# Patient Record
Sex: FEMALE | Race: WHITE | NOT HISPANIC OR LATINO | Employment: UNEMPLOYED | URBAN - NONMETROPOLITAN AREA
[De-identification: names, ages, dates, MRNs, and addresses within clinical notes are randomized per-mention and may not be internally consistent; named-entity substitution may affect disease eponyms.]

---

## 2018-04-19 ENCOUNTER — CONSULT (OUTPATIENT)
Dept: CARDIOLOGY | Facility: CLINIC | Age: 58
End: 2018-04-19

## 2018-04-19 VITALS
HEART RATE: 66 BPM | WEIGHT: 137 LBS | HEIGHT: 60 IN | BODY MASS INDEX: 26.9 KG/M2 | SYSTOLIC BLOOD PRESSURE: 150 MMHG | DIASTOLIC BLOOD PRESSURE: 100 MMHG

## 2018-04-19 DIAGNOSIS — J44.9 COPD MIXED TYPE (HCC): ICD-10-CM

## 2018-04-19 DIAGNOSIS — R06.02 SHORTNESS OF BREATH: ICD-10-CM

## 2018-04-19 DIAGNOSIS — Z72.0 TOBACCO ABUSE: ICD-10-CM

## 2018-04-19 DIAGNOSIS — R07.89 OTHER CHEST PAIN: ICD-10-CM

## 2018-04-19 DIAGNOSIS — E78.00 HYPERCHOLESTEREMIA: ICD-10-CM

## 2018-04-19 DIAGNOSIS — I10 ESSENTIAL HYPERTENSION: ICD-10-CM

## 2018-04-19 DIAGNOSIS — I73.9 PAD (PERIPHERAL ARTERY DISEASE) (HCC): ICD-10-CM

## 2018-04-19 DIAGNOSIS — I77.9 BILATERAL CAROTID ARTERY DISEASE (HCC): Primary | ICD-10-CM

## 2018-04-19 PROCEDURE — 99406 BEHAV CHNG SMOKING 3-10 MIN: CPT | Performed by: INTERNAL MEDICINE

## 2018-04-19 PROCEDURE — 93000 ELECTROCARDIOGRAM COMPLETE: CPT | Performed by: INTERNAL MEDICINE

## 2018-04-19 PROCEDURE — 99204 OFFICE O/P NEW MOD 45 MIN: CPT | Performed by: INTERNAL MEDICINE

## 2018-04-19 RX ORDER — LISINOPRIL AND HYDROCHLOROTHIAZIDE 12.5; 1 MG/1; MG/1
1 TABLET ORAL DAILY
Qty: 30 TABLET | Refills: 8 | Status: SHIPPED | OUTPATIENT
Start: 2018-04-19 | End: 2018-12-13 | Stop reason: SDUPTHER

## 2018-04-19 RX ORDER — CLOPIDOGREL BISULFATE 75 MG/1
75 TABLET ORAL DAILY
COMMUNITY
End: 2018-07-19 | Stop reason: ALTCHOICE

## 2018-04-19 RX ORDER — LISINOPRIL AND HYDROCHLOROTHIAZIDE 12.5; 1 MG/1; MG/1
1 TABLET ORAL DAILY
COMMUNITY
End: 2018-04-19 | Stop reason: SDUPTHER

## 2018-04-19 RX ORDER — ALBUTEROL SULFATE 90 UG/1
2 AEROSOL, METERED RESPIRATORY (INHALATION) EVERY 4 HOURS PRN
COMMUNITY

## 2018-04-19 RX ORDER — ASPIRIN 81 MG/1
81 TABLET ORAL DAILY
COMMUNITY

## 2018-04-19 RX ORDER — PROPRANOLOL HYDROCHLORIDE 20 MG/1
20 TABLET ORAL 3 TIMES DAILY
COMMUNITY

## 2018-04-19 RX ORDER — NIFEDIPINE 30 MG/1
30 TABLET, EXTENDED RELEASE ORAL DAILY
Qty: 30 TABLET | Refills: 8 | Status: SHIPPED | OUTPATIENT
Start: 2018-04-19 | End: 2018-12-13 | Stop reason: SDUPTHER

## 2018-04-19 NOTE — PROGRESS NOTES
I advised the patient of the risks in continuing to use tobacco, and I provided this patient with smoking cessation educational materials.  I also discussed how to quit smoking and the patient has expressed the willingness to quit.      During this visit, I spent > 3-10 minutes counseling the patient regarding smoking cessation.

## 2018-04-19 NOTE — PROGRESS NOTES
CARDIAC COMPLAINTS  chest pressure/discomfort, claudication, dyspnea and Pre OP Clearance      Subjective   Felicia Contreras is a 57 y.o. female came in today for her initial cardiac evaluation.  She has history of hypertension, COPD and also has history of tobacco abuse.  She has been having problem with vision changes, unsteady gait and slurred speech.  She had abnormal carotid ultrasound.  CT of the carotid showed occluded right ICA and 75% left ICA and also has problem with the vertebral artery.  She was seen by the neurosurgeon and is now referred for cardiac clearance.  She does have dull aching chest pain involving the left side of the chest radiating to the neck and the left arm.  It occurs both during exertion as well as rest.  It is associated with increasing shortness of breath.  She also has few episodes of orthopnea.  She has some episodes of dizziness and lightheadedness which happens when she gets up to fast.  She also has bilateral claudication pain mostly on exertion and occasionally at rest.  Her lab work showed erythrocytosis, normal renal function.  Her cholesterol level is not available.  She is a smoker for the last 40 years.  Apparently she was given Chantix but she is afraid to take it.    Past Surgical History:   Procedure Laterality Date   • OTHER SURGICAL HISTORY  04/12/2018    CTA- 75% (L) ICA. 100% (R) ICA. 100% (R) Verteberal artery.. Small aneurysm of Basilar Artery       Current Outpatient Prescriptions   Medication Sig Dispense Refill   • albuterol (PROVENTIL HFA;VENTOLIN HFA) 108 (90 Base) MCG/ACT inhaler Inhale 2 puffs Every 4 (Four) Hours As Needed for Wheezing.     • aspirin 81 MG EC tablet Take 81 mg by mouth Daily.     • clopidogrel (PLAVIX) 75 MG tablet Take 75 mg by mouth Daily.     • lisinopril-hydrochlorothiazide (PRINZIDE,ZESTORETIC) 10-12.5 MG per tablet Take 1 tablet by mouth Daily. 30 tablet 8   • propranolol (INDERAL) 20 MG tablet Take 20 mg by mouth 3 (Three) Times a  Day.     • tiotropium bromide monohydrate (SPIRIVA RESPIMAT) 2.5 MCG/ACT aerosol solution inhaler Inhale 2 puffs Daily.     • nicotine (NICOTROL) 10 MG inhaler Inhale 1 puff As Needed for Smoking Cessation. 30 inhaler 8   • NIFEdipine XL (PROCARDIA XL) 30 MG 24 hr tablet Take 1 tablet by mouth Daily. 30 tablet 8     No current facility-administered medications for this visit.            ALLERGIES:  Review of patient's allergies indicates no known allergies.    Past Medical History:   Diagnosis Date   • History of hysterectomy    • Hyperlipidemia    • Hypertension        History   Smoking Status   • Current Every Day Smoker   • Packs/day: 2.00   • Years: 40.00   • Types: Cigarettes   • Start date: 4/19/1978   Smokeless Tobacco   • Never Used     Comment: Patient counseled on effect's of tobacco use on health , Reports has script for Chantix but has not started yet.          Family History   Problem Relation Age of Onset   • Hypertension Mother    • No Known Problems Sister    • Stroke Maternal Grandmother    • No Known Problems Sister        Review of Systems   Constitution: Positive for weakness and malaise/fatigue. Negative for decreased appetite.   HENT: Negative for congestion and sore throat.    Eyes: Positive for vision loss in right eye and visual disturbance. Negative for blurred vision.   Cardiovascular: Positive for chest pain and dyspnea on exertion.   Respiratory: Positive for cough and shortness of breath. Negative for snoring.    Endocrine: Negative for cold intolerance and heat intolerance.   Hematologic/Lymphatic: Negative for adenopathy. Does not bruise/bleed easily.   Skin: Negative for itching, nail changes and skin cancer.   Musculoskeletal: Positive for arthritis. Negative for myalgias.   Gastrointestinal: Negative for abdominal pain, dysphagia and heartburn.   Genitourinary: Negative for bladder incontinence and frequency.   Neurological: Positive for dizziness and light-headedness. Negative for  "seizures and vertigo.   Psychiatric/Behavioral: Negative for altered mental status.   Allergic/Immunologic: Negative for environmental allergies and hives.       Diabetes- No  Thyroid- normal    Objective     /100 (BP Location: Left arm)   Pulse 66   Ht 152.4 cm (60\")   Wt 62.1 kg (137 lb)   BMI 26.76 kg/m²     Physical Exam   Constitutional: She is oriented to person, place, and time. She appears well-developed and well-nourished.   HENT:   Head: Normocephalic.   Eyes: Pupils are equal, round, and reactive to light.   Neck: Normal range of motion. Neck supple. Carotid bruit is present.   Cardiovascular: Normal rate, regular rhythm, S1 normal and S2 normal.    Murmur heard.  Pulses:       Dorsalis pedis pulses are 1+ on the right side, and 1+ on the left side.        Posterior tibial pulses are 1+ on the right side, and 1+ on the left side.   Pulmonary/Chest: Breath sounds normal.   Abdominal: Soft. Bowel sounds are normal.   Musculoskeletal: Normal range of motion. She exhibits no edema.   Neurological: She is alert and oriented to person, place, and time.   Skin: Skin is warm and dry.   Psychiatric: She has a normal mood and affect.         ECG 12 Lead  Date/Time: 4/19/2018 1:47 PM  Performed by: CATIA RONQUILLO  Authorized by: CATIA RONQUILLO   Previous ECG: no previous ECG available  Rhythm: sinus rhythm  Rate: normal  QRS axis: normal  Clinical impression: non-specific ECG              Assessment/Plan     Felicia was seen today for establish care, chest pain, shortness of breath and dizziness.    Diagnoses and all orders for this visit:    Bilateral carotid artery disease    Essential hypertension  -     Stress Test With Myocardial Perfusion One Day; Future  -     NIFEdipine XL (PROCARDIA XL) 30 MG 24 hr tablet; Take 1 tablet by mouth Daily.  -     lisinopril-hydrochlorothiazide (PRINZIDE,ZESTORETIC) 10-12.5 MG per tablet; Take 1 tablet by mouth Daily.    Hypercholesteremia  -     Stress Test With " Myocardial Perfusion One Day; Future    Shortness of breath  -     Adult Transthoracic Echo Complete W/ Cont if Necessary Per Protocol; Future    Other chest pain  -     Stress Test With Myocardial Perfusion One Day; Future    COPD mixed type  -     Adult Transthoracic Echo Complete W/ Cont if Necessary Per Protocol; Future    PAD (peripheral artery disease)  -     US Ankle / Brachial Indices Extremity Complete; Future    Tobacco abuse    Other orders  -     nicotine (NICOTROL) 10 MG inhaler; Inhale 1 puff As Needed for Smoking Cessation.     At baseline her heart rate is stable but the blood pressure is elevated.  Her EKG showed sinus rhythm with nonspecific ST changes.  Her clinical examination reveals bilateral carotid bruit, diminished peripheral pulse and short systolic murmur at the mitral area.  I had a very long talk with her about the smoking and the increased risk of developing more vascular complications.  She is willing to try Nicotrol inhalers.  Prescription was given for that.  I also started her on Procardia XL 30 mg once a day for blood pressure control and may help with the claudication pain.  I scheduled her to undergo an echocardiogram to evaluate the LV function and the valvular structures.  She also need a stress test in the form of Lexiscan to evaluate for ischemia.  She also need an JOSE secondary to the claudication pain.  If the septum was well-perfused, and the EF is normal she'll be cleared to undergo the carotid surgery with moderate risk.  If there is significant septal ischemia or if there is LV dysfunction, then she may need to undergo cardiac catheterization prior to the surgery.  Based on the results of these tests, further recommendations will be made.               Electronically signed by Amanuel Vidal MD April 19, 2018 1:38 PM

## 2018-04-25 DIAGNOSIS — I73.9 CLAUDICATION (HCC): Primary | ICD-10-CM

## 2018-04-30 ENCOUNTER — HOSPITAL ENCOUNTER (OUTPATIENT)
Dept: CARDIOLOGY | Facility: HOSPITAL | Age: 58
Discharge: HOME | End: 2018-04-30
Attending: INTERNAL MEDICINE

## 2018-04-30 ENCOUNTER — OUTSIDE FACILITY SERVICE (OUTPATIENT)
Dept: CARDIOLOGY | Facility: CLINIC | Age: 58
End: 2018-04-30

## 2018-04-30 DIAGNOSIS — R07.89 OTHER CHEST PAIN: ICD-10-CM

## 2018-04-30 DIAGNOSIS — R06.02 SHORTNESS OF BREATH: ICD-10-CM

## 2018-04-30 DIAGNOSIS — E78.00 HYPERCHOLESTEREMIA: ICD-10-CM

## 2018-04-30 DIAGNOSIS — J44.9 COPD MIXED TYPE (HCC): ICD-10-CM

## 2018-04-30 DIAGNOSIS — I10 ESSENTIAL HYPERTENSION: ICD-10-CM

## 2018-04-30 LAB
MAXIMAL PREDICTED HEART RATE: 161 BPM
MAXIMAL PREDICTED HEART RATE: 161 BPM
STRESS TARGET HR: 137 BPM
STRESS TARGET HR: 137 BPM

## 2018-04-30 PROCEDURE — 93306 TTE W/DOPPLER COMPLETE: CPT

## 2018-04-30 PROCEDURE — 0 TECHNETIUM SESTAMIBI: Performed by: INTERNAL MEDICINE

## 2018-04-30 PROCEDURE — 78452 HT MUSCLE IMAGE SPECT MULT: CPT

## 2018-04-30 PROCEDURE — 25010000002 REGADENOSON 0.4 MG/5ML SOLUTION: Performed by: INTERNAL MEDICINE

## 2018-04-30 PROCEDURE — A9500 TC99M SESTAMIBI: HCPCS | Performed by: INTERNAL MEDICINE

## 2018-04-30 PROCEDURE — 93017 CV STRESS TEST TRACING ONLY: CPT

## 2018-04-30 RX ADMIN — REGADENOSON 0.4 MG: 0.08 INJECTION, SOLUTION INTRAVENOUS at 10:45

## 2018-04-30 RX ADMIN — TECHNETIUM TC 99M SESTAMIBI 1 DOSE: 1 INJECTION INTRAVENOUS at 10:45

## 2018-05-02 ENCOUNTER — TELEPHONE (OUTPATIENT)
Dept: CARDIOLOGY | Facility: CLINIC | Age: 58
End: 2018-05-02

## 2018-05-02 NOTE — TELEPHONE ENCOUNTER
Patient aware of stress test and echo results and recommendations.  Negative for ischemia, normal LV function.  Continue home medications.  Patient is cleared for surgery with the usual precautions.  Clearance letter faxed to Dr. Lopez's office.

## 2018-05-03 ENCOUNTER — TELEPHONE (OUTPATIENT)
Dept: CARDIOLOGY | Facility: CLINIC | Age: 58
End: 2018-05-03

## 2018-05-03 NOTE — TELEPHONE ENCOUNTER
Insurance has denied CT angio abdominal aorta indio Iliofem runoff. On denial notification if you do not agree with decision, may have peer to peer request.  What is your recommendations? Thanks

## 2018-07-19 ENCOUNTER — TELEPHONE (OUTPATIENT)
Dept: CARDIOLOGY | Facility: CLINIC | Age: 58
End: 2018-07-19

## 2018-07-19 ENCOUNTER — OFFICE VISIT (OUTPATIENT)
Dept: CARDIOLOGY | Facility: CLINIC | Age: 58
End: 2018-07-19

## 2018-07-19 VITALS
DIASTOLIC BLOOD PRESSURE: 78 MMHG | WEIGHT: 139 LBS | SYSTOLIC BLOOD PRESSURE: 130 MMHG | BODY MASS INDEX: 27.29 KG/M2 | HEIGHT: 60 IN | HEART RATE: 64 BPM

## 2018-07-19 DIAGNOSIS — E78.00 HYPERCHOLESTEREMIA: ICD-10-CM

## 2018-07-19 DIAGNOSIS — Z72.0 TOBACCO ABUSE: ICD-10-CM

## 2018-07-19 DIAGNOSIS — J44.9 COPD MIXED TYPE (HCC): ICD-10-CM

## 2018-07-19 DIAGNOSIS — I73.9 PAD (PERIPHERAL ARTERY DISEASE) (HCC): ICD-10-CM

## 2018-07-19 DIAGNOSIS — I77.9 BILATERAL CAROTID ARTERY DISEASE (HCC): Primary | ICD-10-CM

## 2018-07-19 DIAGNOSIS — I10 ESSENTIAL HYPERTENSION: ICD-10-CM

## 2018-07-19 PROCEDURE — 99214 OFFICE O/P EST MOD 30 MIN: CPT | Performed by: NURSE PRACTITIONER

## 2018-07-19 RX ORDER — NICOTINE 21 MG/24HR
1 PATCH, TRANSDERMAL 24 HOURS TRANSDERMAL EVERY 24 HOURS
COMMUNITY
End: 2019-01-30 | Stop reason: SDUPTHER

## 2018-07-19 NOTE — PROGRESS NOTES
Chief Complaint   Patient presents with   • Follow-up     Cardiac management. She reports B/P stable at home. She reports still having headaches, left eye feels burning sensation, PCP to referral to Dr Badillo. Had carotid surgery 6/11/18 per Dr Lopez.       Subjective       Felicia Contreras is a 57 y.o. female with a history of hypertension, COPD, and tobacco use who was referred for her initial cardiac evaluation in April 2018 after experiencing vision changes, unsteady gait and slurred speech found to have occluded PERCY and 75% LICA and 100% (R) vertebral artery stenosis. She was referred for cardiac clearance prior to surgery. She did report dull aching in chest and SOB, so Lexiscan and echocardiogram were done which showed no ischemia, normal LV function, and normal PA pressure. She was cleared for surgery. Blood pressure was elevated at consult, and nifedipine added. JOSE was done for claudication pain showing 0.7 on right and 0.8 on left. CTA was recommended but insurance denied stating she needed a trial of conservative management including walking plan, medication, and smoking cessation. She underwent (L) carotid endarterectomy with Dr. Vincent Sánchez on 6/11/18 without complication. She developed post op headache with negative CT. Post op carotid US showed 50-69% with plan to repeat at follow up. Apparently she developed thrombocytopenia with Plavix and aspirin and discharged with aspirin only. Platelets 275 after holding Plavix and aspirin several days. P2Y12 194 on day of surgery. She was evaluated by Dr. Johnson who felt the thrombocytopenia was medication induced. BUN/Cr 20/.9, no lipids available.  She is not on statin.     HPI         Cardiac History:    Past Surgical History:   Procedure Laterality Date   • CARDIOVASCULAR STRESS TEST  04/30/2018    L. Cardiolite- Negative   • ECHO - CONVERTED  04/30/2018    TLS. EF 65%. RVSP- 18 mmHg   • OTHER SURGICAL HISTORY  04/12/2018    CTA- 75% (L) ICA. 100% (R) ICA.  100% (R) Verteberal artery.. Small aneurysm of Basilar Artery       Current Outpatient Prescriptions   Medication Sig Dispense Refill   • albuterol (PROVENTIL HFA;VENTOLIN HFA) 108 (90 Base) MCG/ACT inhaler Inhale 2 puffs Every 4 (Four) Hours As Needed for Wheezing.     • aspirin 81 MG EC tablet Take 81 mg by mouth Daily.     • lisinopril-hydrochlorothiazide (PRINZIDE,ZESTORETIC) 10-12.5 MG per tablet Take 1 tablet by mouth Daily. 30 tablet 8   • nicotine (NICODERM CQ) 21 MG/24HR patch Place 1 patch on the skin Daily.     • NIFEdipine XL (PROCARDIA XL) 30 MG 24 hr tablet Take 1 tablet by mouth Daily. 30 tablet 8   • propranolol (INDERAL) 20 MG tablet Take 20 mg by mouth 3 (Three) Times a Day.     • tiotropium bromide monohydrate (SPIRIVA RESPIMAT) 2.5 MCG/ACT aerosol solution inhaler Inhale 2 puffs Daily.       No current facility-administered medications for this visit.        Patient has no known allergies.    Past Medical History:   Diagnosis Date   • History of hysterectomy    • Hyperlipidemia    • Hypertension    • Status post carotid surgery 06/11/2018       Social History     Social History   • Marital status:      Spouse name: N/A   • Number of children: N/A   • Years of education: N/A     Occupational History   • Not on file.     Social History Main Topics   • Smoking status: Current Every Day Smoker     Packs/day: 0.50     Years: 40.00     Types: Cigarettes     Start date: 4/19/1978   • Smokeless tobacco: Never Used      Comment: Patient counseled on effect's of tobacco use on health , Reports has script for Chantix but has not started yet.   • Alcohol use No      Comment: rarely drinks a glass of wine    • Drug use: Yes     Types: Marijuana      Comment: has used marijuana in the past.   • Sexual activity: Defer     Other Topics Concern   • Not on file     Social History Narrative   • No narrative on file       Family History   Problem Relation Age of Onset   • Hypertension Mother    • No Known  "Problems Sister    • Stroke Maternal Grandmother    • No Known Problems Sister        Review of Systems   Constitution: Positive for malaise/fatigue and weight gain (up 2 lb).   Eyes: Positive for blurred vision and visual disturbance.   Cardiovascular: Positive for dyspnea on exertion (mild ). Negative for chest pain, leg swelling, orthopnea, palpitations and syncope.   Respiratory: Negative.    Endocrine: Negative.    Hematologic/Lymphatic: Negative for bleeding problem. Does not bruise/bleed easily.   Skin: Negative.    Musculoskeletal: Negative for falls and myalgias.   Gastrointestinal: Positive for constipation and diarrhea. Negative for abdominal pain and melena.   Genitourinary: Negative for dysuria and hematuria.   Neurological: Positive for headaches (improving ) and loss of balance (unsteady gait, legs weak). Negative for dizziness (improved ).   Psychiatric/Behavioral: Negative for altered mental status and depression.   Allergic/Immunologic: Negative.       Diabetes- No  Thyroid-normal    Objective     /78 (BP Location: Right arm)   Pulse 64   Ht 152.4 cm (60\")   Wt 63 kg (139 lb)   BMI 27.15 kg/m²     Physical Exam   Constitutional: She is oriented to person, place, and time. She appears well-developed and well-nourished.   HENT:   Head: Normocephalic.   Eyes: Pupils are equal, round, and reactive to light.   Neck: Normal range of motion.   Cardiovascular: Normal rate and regular rhythm.   No extrasystoles are present. Exam reveals decreased pulses.    Pulses:       Radial pulses are 1+ on the right side, and 1+ on the left side.   Brachial pulses 2+   Abdominal: Soft. Bowel sounds are normal.   Musculoskeletal: Normal range of motion. She exhibits no edema.   Neurological: She is alert and oriented to person, place, and time.   Skin: Skin is warm and dry.   Psychiatric: She has a normal mood and affect.   Nursing note and vitals reviewed.     Procedures          Assessment/Plan    Heart rate " and blood pressure stable. Blood pressure has been better controlled with the addition of nifedipine. We reviewed her cardiac work up with stress and echo showing normal LV function and no ischemia. We reviewed the reports from hospitalization and carotid endarterectomy. She will follow with Dr. Vincent Sánchez. Regarding the PAD, she is able to walk without significant claudication. I explained the importance of smoking cessation which she is going to consider and try Chantix for which she already has a prescription from Dr. Fried. She is encouraged to establish a regular walking regimen. Continue aspirin. With her reported history of thrombocytopenia, Plavix or Pletal not added back today. She needs to be on a statin if there is no contraindication. Will try to obtain lipids from Dr. Fried. If the claudication worsens, will order CTA again and do the peer to peer to get it approved. If the CTA shows significant disease, will refer to Dr. Palmer for further evaluation. She was given literature on management of PAD. Limit sodium intake to 1,500 mg daily. DASH diet provided. Further recommendations once lipids are available. We will see her back in six months or sooner if needed.   Felicia was seen today for follow-up.    Diagnoses and all orders for this visit:    Bilateral carotid artery disease (CMS/HCC)    PAD (peripheral artery disease) (CMS/HCC)    Essential hypertension    Hypercholesteremia    COPD mixed type (CMS/HCC)    Tobacco abuse        Patient's Body mass index is 27.15 kg/m². BMI is above normal parameters. Recommendations include: nutrition counseling.       I advised Felicia of the risks of continuing to use tobacco, and I provided her with tobacco cessation educational materials in the After Visit Summary.     During this visit, I spent <3 minutes counseling the patient regarding tobacco cessation.                 Electronically signed by MAC Chinchilla,  July 19, 2018 9:48 AM

## 2018-07-19 NOTE — TELEPHONE ENCOUNTER
Can we try to find a lipid panel on Ms. Contreras?    I can't access hosp computer. We got labs from preop but no lipids or LFT.  Maybe look back in [employer] or Dr. Fried.      She needs to be started on a statin with her carotid and PAD.     Thank you.

## 2018-07-19 NOTE — PATIENT INSTRUCTIONS
Peripheral Vascular Disease  Peripheral vascular disease (PVD) is a disease of the blood vessels that are not part of your heart and brain. A simple term for PVD is poor circulation. In most cases, PVD narrows the blood vessels that carry blood from your heart to the rest of your body. This can result in a decreased supply of blood to your arms, legs, and internal organs, like your stomach or kidneys. However, it most often affects a person’s lower legs and feet.  There are two types of PVD.  · Organic PVD. This is the more common type. It is caused by damage to the structure of blood vessels.  · Functional PVD. This is caused by conditions that make blood vessels contract and tighten (spasm).    Without treatment, PVD tends to get worse over time.  PVD can also lead to acute ischemic limb. This is when an arm or limb suddenly has trouble getting enough blood. This is a medical emergency.  Follow these instructions at home:  · Take medicines only as told by your doctor.  · Do not use any tobacco products, including cigarettes, chewing tobacco, or electronic cigarettes. If you need help quitting, ask your doctor.  · Lose weight if you are overweight, and maintain a healthy weight as told by your doctor.  · Eat a diet that is low in fat and cholesterol. If you need help, ask your doctor.  · Exercise regularly. Ask your doctor for some good activities for you.  · Take good care of your feet.  ? Wear comfortable shoes that fit well.  ? Check your feet often for any cuts or sores.  Contact a doctor if:  · You have cramps in your legs while walking.  · You have leg pain when you are at rest.  · You have coldness in a leg or foot.  · Your skin changes.  · You are unable to get or have an erection (erectile dysfunction).  · You have cuts or sores on your feet that are not healing.  Get help right away if:  · Your arm or leg turns cold and blue.  · Your arms or legs become red, warm, swollen, painful, or numb.  · You have  "chest pain or trouble breathing.  · You suddenly have weakness in your face, arm, or leg.  · You become very confused or you cannot speak.  · You suddenly have a very bad headache.  · You suddenly cannot see.  This information is not intended to replace advice given to you by your health care provider. Make sure you discuss any questions you have with your health care provider.  Document Released: 03/14/2011 Document Revised: 05/25/2017 Document Reviewed: 05/28/2015  [employer] Interactive Patient Education © 2017 [employer]    DASH Eating Plan  DASH stands for \"Dietary Approaches to Stop Hypertension.\" The DASH eating plan is a healthy eating plan that has been shown to reduce high blood pressure (hypertension). It may also reduce your risk for type 2 diabetes, heart disease, and stroke. The DASH eating plan may also help with weight loss.  What are tips for following this plan?  General guidelines  · Avoid eating more than 2,300 mg (milligrams) of salt (sodium) a day. If you have hypertension, you may need to reduce your sodium intake to 1,500 mg a day.  · Limit alcohol intake to no more than 1 drink a day for nonpregnant women and 2 drinks a day for men. One drink equals 12 oz of beer, 5 oz of wine, or 1½ oz of hard liquor.  · Work with your health care provider to maintain a healthy body weight or to lose weight. Ask what an ideal weight is for you.  · Get at least 30 minutes of exercise that causes your heart to beat faster (aerobic exercise) most days of the week. Activities may include walking, swimming, or biking.  · Work with your health care provider or diet and nutrition specialist (dietitian) to adjust your eating plan to your individual calorie needs.  Reading food labels  · Check food labels for the amount of sodium per serving. Choose foods with less than 5 percent of the Daily Value of sodium. Generally, foods with less than 300 mg of sodium per serving fit into this eating plan.  · To find whole " "grains, look for the word \"whole\" as the first word in the ingredient list.  Shopping  · Buy products labeled as \"low-sodium\" or \"no salt added.\"  · Buy fresh foods. Avoid canned foods and premade or frozen meals.  Cooking  · Avoid adding salt when cooking. Use salt-free seasonings or herbs instead of table salt or sea salt. Check with your health care provider or pharmacist before using salt substitutes.  · Do not robin foods. Cook foods using healthy methods such as baking, boiling, grilling, and broiling instead.  · Cook with heart-healthy oils, such as olive, canola, soybean, or sunflower oil.  Meal planning    · Eat a balanced diet that includes:  ? 5 or more servings of fruits and vegetables each day. At each meal, try to fill half of your plate with fruits and vegetables.  ? Up to 6-8 servings of whole grains each day.  ? Less than 6 oz of lean meat, poultry, or fish each day. A 3-oz serving of meat is about the same size as a deck of cards. One egg equals 1 oz.  ? 2 servings of low-fat dairy each day.  ? A serving of nuts, seeds, or beans 5 times each week.  ? Heart-healthy fats. Healthy fats called Omega-3 fatty acids are found in foods such as flaxseeds and coldwater fish, like sardines, salmon, and mackerel.  · Limit how much you eat of the following:  ? Canned or prepackaged foods.  ? Food that is high in trans fat, such as fried foods.  ? Food that is high in saturated fat, such as fatty meat.  ? Sweets, desserts, sugary drinks, and other foods with added sugar.  ? Full-fat dairy products.  · Do not salt foods before eating.  · Try to eat at least 2 vegetarian meals each week.  · Eat more home-cooked food and less restaurant, buffet, and fast food.  · When eating at a restaurant, ask that your food be prepared with less salt or no salt, if possible.  What foods are recommended?  The items listed may not be a complete list. Talk with your dietitian about what dietary choices are best for " you.  Grains  Whole-grain or whole-wheat bread. Whole-grain or whole-wheat pasta. Brown rice. Oatmeal. Quinoa. Bulgur. Whole-grain and low-sodium cereals. Emily bread. Low-fat, low-sodium crackers. Whole-wheat flour tortillas.  Vegetables  Fresh or frozen vegetables (raw, steamed, roasted, or grilled). Low-sodium or reduced-sodium tomato and vegetable juice. Low-sodium or reduced-sodium tomato sauce and tomato paste. Low-sodium or reduced-sodium canned vegetables.  Fruits  All fresh, dried, or frozen fruit. Canned fruit in natural juice (without added sugar).  Meat and other protein foods  Skinless chicken or turkey. Ground chicken or turkey. Pork with fat trimmed off. Fish and seafood. Egg whites. Dried beans, peas, or lentils. Unsalted nuts, nut butters, and seeds. Unsalted canned beans. Lean cuts of beef with fat trimmed off. Low-sodium, lean deli meat.  Dairy  Low-fat (1%) or fat-free (skim) milk. Fat-free, low-fat, or reduced-fat cheeses. Nonfat, low-sodium ricotta or cottage cheese. Low-fat or nonfat yogurt. Low-fat, low-sodium cheese.  Fats and oils  Soft margarine without trans fats. Vegetable oil. Low-fat, reduced-fat, or light mayonnaise and salad dressings (reduced-sodium). Canola, safflower, olive, soybean, and sunflower oils. Avocado.  Seasoning and other foods  Herbs. Spices. Seasoning mixes without salt. Unsalted popcorn and pretzels. Fat-free sweets.  What foods are not recommended?  The items listed may not be a complete list. Talk with your dietitian about what dietary choices are best for you.  Grains  Baked goods made with fat, such as croissants, muffins, or some breads. Dry pasta or rice meal packs.  Vegetables  Creamed or fried vegetables. Vegetables in a cheese sauce. Regular canned vegetables (not low-sodium or reduced-sodium). Regular canned tomato sauce and paste (not low-sodium or reduced-sodium). Regular tomato and vegetable juice (not low-sodium or reduced-sodium). Cammy.  Olives.  Fruits  Canned fruit in a light or heavy syrup. Fried fruit. Fruit in cream or butter sauce.  Meat and other protein foods  Fatty cuts of meat. Ribs. Fried meat. Jacob. Sausage. Bologna and other processed lunch meats. Salami. Fatback. Hotdogs. Bratwurst. Salted nuts and seeds. Canned beans with added salt. Canned or smoked fish. Whole eggs or egg yolks. Chicken or turkey with skin.  Dairy  Whole or 2% milk, cream, and half-and-half. Whole or full-fat cream cheese. Whole-fat or sweetened yogurt. Full-fat cheese. Nondairy creamers. Whipped toppings. Processed cheese and cheese spreads.  Fats and oils  Butter. Stick margarine. Lard. Shortening. Ghee. Jacob fat. Tropical oils, such as coconut, palm kernel, or palm oil.  Seasoning and other foods  Salted popcorn and pretzels. Onion salt, garlic salt, seasoned salt, table salt, and sea salt. Worcestershire sauce. Tartar sauce. Barbecue sauce. Teriyaki sauce. Soy sauce, including reduced-sodium. Steak sauce. Canned and packaged gravies. Fish sauce. Oyster sauce. Cocktail sauce. Horseradish that you find on the shelf. Ketchup. Mustard. Meat flavorings and tenderizers. Bouillon cubes. Hot sauce and Tabasco sauce. Premade or packaged marinades. Premade or packaged taco seasonings. Relishes. Regular salad dressings.  Where to find more information:  · National Heart, Lung, and [employer] [place]: www.nhlbi.nih.gov  · American Heart Association: www.heart.org  Summary  · The DASH eating plan is a healthy eating plan that has been shown to reduce high blood pressure (hypertension). It may also reduce your risk for type 2 diabetes, heart disease, and stroke.  · With the DASH eating plan, you should limit salt (sodium) intake to 2,300 mg a day. If you have hypertension, you may need to reduce your sodium intake to 1,500 mg a day.  · When on the DASH eating plan, aim to eat more fresh fruits and vegetables, whole grains, lean proteins, low-fat dairy, and heart-healthy  fats.  · Work with your health care provider or diet and nutrition specialist (dietitian) to adjust your eating plan to your individual calorie needs.  This information is not intended to replace advice given to you by your health care provider. Make sure you discuss any questions you have with your health care provider.  Document Released: 12/06/2012 Document Revised: 12/11/2017 Document Reviewed: 12/11/2017  [employer] Interactive Patient Education © 2018 [employer].

## 2018-10-22 ENCOUNTER — TELEPHONE (OUTPATIENT)
Dept: CARDIOLOGY | Facility: CLINIC | Age: 58
End: 2018-10-22

## 2018-10-22 NOTE — TELEPHONE ENCOUNTER
Patient called requesting second opinion for PAD, she reports had testing in [place] that showed she had blockage in right leg and multiple small blockages in left leg, was scheduled for surgery with Dr Clancy for possible stenting yet would like second opinion. She reports having nonhealing ulcers on legs -following with podiatrist, having pain and cramping in feet and legs causing it to be hard to ambulate.

## 2018-10-22 NOTE — TELEPHONE ENCOUNTER
Can we refer her to Dr. Ely? He is having clinic at Dr. Barrios's office twice a month. If he hasn't started yet, can she go to [place]?    Looks like she may already have an appt with him on Wed?

## 2018-10-23 NOTE — TELEPHONE ENCOUNTER
Phoned patient with recommendations for referral to Dr Ely, patient reports that she already has appointment with Dr Ely and she will keep next appointment with Dr Ely.

## 2018-11-28 ENCOUNTER — OFFICE VISIT (OUTPATIENT)
Dept: CARDIOLOGY | Facility: CLINIC | Age: 58
End: 2018-11-28

## 2018-11-28 VITALS
BODY MASS INDEX: 25.52 KG/M2 | WEIGHT: 130 LBS | SYSTOLIC BLOOD PRESSURE: 128 MMHG | OXYGEN SATURATION: 98 % | HEART RATE: 59 BPM | DIASTOLIC BLOOD PRESSURE: 63 MMHG | HEIGHT: 60 IN

## 2018-11-28 DIAGNOSIS — L98.492 ISCHEMIC ULCER WITH FAT LAYER EXPOSED (HCC): Primary | ICD-10-CM

## 2018-11-28 DIAGNOSIS — I73.9 PAD (PERIPHERAL ARTERY DISEASE) (HCC): ICD-10-CM

## 2018-11-28 DIAGNOSIS — I70.229 CRITICAL LOWER LIMB ISCHEMIA (HCC): ICD-10-CM

## 2018-11-28 DIAGNOSIS — I70.201 OCCLUSION OF RIGHT FEMORAL ARTERY (HCC): ICD-10-CM

## 2018-11-28 DIAGNOSIS — I70.202 OCCLUSION OF LEFT FEMORAL ARTERY (HCC): ICD-10-CM

## 2018-11-28 PROCEDURE — 99214 OFFICE O/P EST MOD 30 MIN: CPT | Performed by: INTERNAL MEDICINE

## 2018-11-28 NOTE — PROGRESS NOTES
[facility] CARDIOLOGY  [address] [place] [id number]  Phone: [phone]  Fax: [phone]    11/28/2018    Chief Complaint   Patient presents with   • Peripheral Vascular Disease        History:   Felicia Contreras is a 58 y.o. female seen in consultation, referred by Dr.Mohammad Corky MD  For PVD. Patient is c/o bilateral leg pain. She states her left is worse than the right and it is mainly her foot that is bothering her. She states she went to [facility] on 11/23 where they told her she had gangrene. Patient recently saw Dr. Dye and wanted a second opinion. She states she has ulcers on her left foot and that they are not getting any better. She recently had surgery back in June on her Carotid artery.     Past Medical History:   Diagnosis Date   • History of hysterectomy    • Hyperlipidemia    • Hypertension    • Status post carotid surgery 06/11/2018       Past Surgical History:   Procedure Laterality Date   • CARDIOVASCULAR STRESS TEST  04/30/2018    L. Cardiolite- Negative   • ECHO - CONVERTED  04/30/2018    TLS. EF 65%. RVSP- 18 mmHg   • OTHER SURGICAL HISTORY  04/12/2018    CTA- 75% (L) ICA. 100% (R) ICA. 100% (R) Verteberal artery.. Small aneurysm of Basilar Artery        Review of Systems:  Please see HPI  Constitution: No chills, no rigors, no unexplained weight loss or weight gain  Eyes:  No diplopia, no blurred vision, no loss of vision, conjunctiva is pink and sclera is anicteric  ENT:  No tinnitus, no otorrhea, no epistaxis, no sore throat   Respiratory: No cough, no hemoptysis  Cardiovascular: see HPI  Gastrointestinal: No nausea, no vomiting, no hematemesis, no diarrhea or constipation, no melena  Genitourinary: No frequency of dysuria no hematuria  Integument: No pruritis and  no skin rash  Hematologic / Lymphatic: No excessive bleeding, easy bruising, fatigue, lymphadenopathy and petechiae  Musculoskeletal: No joint pain, joint stiffness, joint  swelling, muscle pain, muscle weakness and neck pain  Neurological: No dizziness, headaches, light headedness, seizures and vertigo  Endocrine: No frequent urination and nocturia, temperature intolerance, weight gain, unintended and weight loss, unintended        Past Social History:  Social History     Socioeconomic History   • Marital status:      Spouse name: Not on file   • Number of children: Not on file   • Years of education: Not on file   • Highest education level: Not on file   Tobacco Use   • Smoking status: Current Every Day Smoker     Packs/day: 0.50     Years: 40.00     Pack years: 20.00     Types: Cigarettes     Start date: 4/19/1978   • Smokeless tobacco: Never Used   • Tobacco comment: Patient counseled on effect's of tobacco use on health , Reports has script for Chantix but has not started yet.   Substance and Sexual Activity   • Alcohol use: No     Comment: rarely drinks a glass of wine    • Drug use: Yes     Types: Marijuana     Comment: has used marijuana in the past.   • Sexual activity: Defer       Past Family History:  Family History   Problem Relation Age of Onset   • Hypertension Mother    • No Known Problems Sister    • Stroke Maternal Grandmother    • No Known Problems Sister        Current Outpatient Medications   Medication Sig Dispense Refill   • albuterol (PROVENTIL HFA;VENTOLIN HFA) 108 (90 Base) MCG/ACT inhaler Inhale 2 puffs Every 4 (Four) Hours As Needed for Wheezing.     • aspirin 81 MG EC tablet Take 81 mg by mouth Daily.     • lisinopril-hydrochlorothiazide (PRINZIDE,ZESTORETIC) 10-12.5 MG per tablet Take 1 tablet by mouth Daily. 30 tablet 8   • nicotine (NICODERM CQ) 21 MG/24HR patch Place 1 patch on the skin Daily.     • NIFEdipine XL (PROCARDIA XL) 30 MG 24 hr tablet Take 1 tablet by mouth Daily. 30 tablet 8   • propranolol (INDERAL) 20 MG tablet Take 20 mg by mouth 3 (Three) Times a Day.     • tiotropium bromide monohydrate (SPIRIVA RESPIMAT) 2.5 MCG/ACT aerosol  solution inhaler Inhale 2 puffs Daily.       No current facility-administered medications for this visit.         No Known Allergies    Objective:  Vitals:    11/28/18 1422   BP: 128/63   Pulse: 59   SpO2: 98%         Comfortable NAD  PERRL, conjunctiva clear  Neck supple, no JVD or thyromegaly appreciated  S1/S2 RRR, no m/r/g  Lungs CTA B, normal effort  Abdomen S/NT/ND (+) BS, no HSM appreciated  Extremities warm, no clubbing, cyanosis, or edema  Normal gait  No visible or palpable skin lesions  A/Ox4, mood and affect appropriate  Pulse exam:   Feet are cold bilateral  Capillary refill is moderate to severely reduced on the left  No evidence of ulceration or color change of the toes  PULSES  Right DP and PT are 1+ and Left DP and PT are 0+    DATA:      Results for orders placed during the hospital encounter of 04/30/18   Adult Transthoracic Echo Complete W/ Cont if Necessary Per Protocol      Results for orders placed during the hospital encounter of 04/30/18   Stress Test With Myocardial Perfusion One Day      Results for orders placed during the hospital encounter of 04/30/18   Stress Test With Myocardial Perfusion One Day        Results for orders placed in visit on 04/19/18   US Ankle / Brachial Indices Extremity Complete           A/P:  Non-healing ischemic ulcers of the left first, second and third toes and the heel  Occlusion of bilateral SFA by ultrasound  Hx of Carotid stenosis with left CEA  Left Carotid bruit  Recent eye surgery  Previous hx of plavix use with ? Bleeding issue after which plavix was stopped  Essential Hypertension  Hyperlipidemia  Heavy tobacco abuse now trying to quit down to 2 cig a day. Counseling provided to quit tobacco.    Plan  Schedule peripheral arteriogram focus on left leg and runoff next week  Repeat Carotid US can be done at PCP      Patient's Body mass index is 25.39 kg/m². BMI is within normal parameters. No follow-up required.       No diagnosis found.     Thank you for  allowing me to participate in the care of Felicia Contreras. Feel free to contact me directly with any further questions or concerns.

## 2018-12-03 ENCOUNTER — HOSPITAL ENCOUNTER (OUTPATIENT)
Facility: HOSPITAL | Age: 58
Discharge: HOME | End: 2018-12-04
Attending: INTERNAL MEDICINE | Admitting: INTERNAL MEDICINE

## 2018-12-03 PROBLEM — I97.410 FEMORAL ARTERY HEMATOMA COMPLICATING CARDIAC CATHETERIZATION: Status: ACTIVE | Noted: 2018-12-03

## 2018-12-03 LAB
ACT BLD: 109 SECONDS (ref 82–152)
ACT BLD: 202 SECONDS (ref 82–152)
ACT BLD: 218 SECONDS (ref 82–152)
ANION GAP SERPL CALCULATED.3IONS-SCNC: 5 MMOL/L (ref 3.6–11.2)
BUN BLD-MCNC: 30 MG/DL (ref 7–21)
BUN/CREAT SERPL: 24.6 (ref 7–25)
CALCIUM SPEC-SCNC: 9.8 MG/DL (ref 7.7–10)
CHLORIDE SERPL-SCNC: 109 MMOL/L (ref 99–112)
CO2 SERPL-SCNC: 24 MMOL/L (ref 24.3–31.9)
CREAT BLD-MCNC: 1.22 MG/DL (ref 0.43–1.29)
DEPRECATED RDW RBC AUTO: 51 FL (ref 37–54)
DEPRECATED RDW RBC AUTO: 51.2 FL (ref 37–54)
ERYTHROCYTE [DISTWIDTH] IN BLOOD BY AUTOMATED COUNT: 15.2 % (ref 11.5–14.5)
ERYTHROCYTE [DISTWIDTH] IN BLOOD BY AUTOMATED COUNT: 15.2 % (ref 11.5–14.5)
GFR SERPL CREATININE-BSD FRML MDRD: 45 ML/MIN/1.73
GLUCOSE BLD-MCNC: 138 MG/DL (ref 70–110)
HCT VFR BLD AUTO: 39.3 % (ref 37–47)
HCT VFR BLD AUTO: 39.5 % (ref 37–47)
HGB BLD-MCNC: 12.2 G/DL (ref 12–16)
HGB BLD-MCNC: 12.4 G/DL (ref 12–16)
MCH RBC QN AUTO: 28.1 PG (ref 27–33)
MCH RBC QN AUTO: 28.8 PG (ref 27–33)
MCHC RBC AUTO-ENTMCNC: 30.9 G/DL (ref 33–37)
MCHC RBC AUTO-ENTMCNC: 31.6 G/DL (ref 33–37)
MCV RBC AUTO: 91 FL (ref 80–94)
MCV RBC AUTO: 91.2 FL (ref 80–94)
OSMOLALITY SERPL CALC.SUM OF ELEC: 284.1 MOSM/KG (ref 273–305)
PLATELET # BLD AUTO: 448 10*3/MM3 (ref 130–400)
PLATELET # BLD AUTO: 488 10*3/MM3 (ref 130–400)
PMV BLD AUTO: 10.1 FL (ref 6–10)
PMV BLD AUTO: 9.9 FL (ref 6–10)
POTASSIUM BLD-SCNC: 3.8 MMOL/L (ref 3.5–5.3)
RBC # BLD AUTO: 4.31 10*6/MM3 (ref 4.2–5.4)
RBC # BLD AUTO: 4.34 10*6/MM3 (ref 4.2–5.4)
SODIUM BLD-SCNC: 138 MMOL/L (ref 135–153)
WBC NRBC COR # BLD: 10.1 10*3/MM3 (ref 4.5–12.5)
WBC NRBC COR # BLD: 9.91 10*3/MM3 (ref 4.5–12.5)

## 2018-12-03 PROCEDURE — 25010000002 PROTAMINE SULFATE PER 10 MG: Performed by: INTERNAL MEDICINE

## 2018-12-03 PROCEDURE — C1725 CATH, TRANSLUMIN NON-LASER: HCPCS | Performed by: INTERNAL MEDICINE

## 2018-12-03 PROCEDURE — C1894 INTRO/SHEATH, NON-LASER: HCPCS | Performed by: INTERNAL MEDICINE

## 2018-12-03 PROCEDURE — 25010000002 HEPARIN (PORCINE) PER 1000 UNITS: Performed by: INTERNAL MEDICINE

## 2018-12-03 PROCEDURE — 75716 ARTERY X-RAYS ARMS/LEGS: CPT | Performed by: INTERNAL MEDICINE

## 2018-12-03 PROCEDURE — 75625 CONTRAST EXAM ABDOMINL AORTA: CPT | Performed by: INTERNAL MEDICINE

## 2018-12-03 PROCEDURE — 80048 BASIC METABOLIC PNL TOTAL CA: CPT | Performed by: INTERNAL MEDICINE

## 2018-12-03 PROCEDURE — C1769 GUIDE WIRE: HCPCS | Performed by: INTERNAL MEDICINE

## 2018-12-03 PROCEDURE — 94799 UNLISTED PULMONARY SVC/PX: CPT

## 2018-12-03 PROCEDURE — C1760 CLOSURE DEV, VASC: HCPCS | Performed by: INTERNAL MEDICINE

## 2018-12-03 PROCEDURE — C1724 CATH, TRANS ATHEREC,ROTATION: HCPCS

## 2018-12-03 PROCEDURE — 85347 COAGULATION TIME ACTIVATED: CPT

## 2018-12-03 PROCEDURE — 25010000002 IOPAMIDOL 61 % SOLUTION: Performed by: INTERNAL MEDICINE

## 2018-12-03 PROCEDURE — 25010000002 FENTANYL CITRATE (PF) 100 MCG/2ML SOLUTION: Performed by: INTERNAL MEDICINE

## 2018-12-03 PROCEDURE — 85027 COMPLETE CBC AUTOMATED: CPT | Performed by: INTERNAL MEDICINE

## 2018-12-03 PROCEDURE — C1714 CATH, TRANS ATHERECTOMY, DIR: HCPCS | Performed by: INTERNAL MEDICINE

## 2018-12-03 PROCEDURE — 94640 AIRWAY INHALATION TREATMENT: CPT

## 2018-12-03 PROCEDURE — 37225 PR REVSC OPN/PRQ FEM/POP W/ATHRC/ANGIOP SM VSL: CPT | Performed by: INTERNAL MEDICINE

## 2018-12-03 PROCEDURE — C1751 CATH, INF, PER/CENT/MIDLINE: HCPCS | Performed by: INTERNAL MEDICINE

## 2018-12-03 PROCEDURE — G0378 HOSPITAL OBSERVATION PER HR: HCPCS

## 2018-12-03 PROCEDURE — C2623 CATH, TRANSLUMIN, DRUG-COAT: HCPCS | Performed by: INTERNAL MEDICINE

## 2018-12-03 PROCEDURE — 25010000002 MIDAZOLAM PER 1 MG: Performed by: INTERNAL MEDICINE

## 2018-12-03 RX ORDER — PROPRANOLOL HYDROCHLORIDE 20 MG/1
20 TABLET ORAL EVERY 8 HOURS SCHEDULED
Status: DISCONTINUED | OUTPATIENT
Start: 2018-12-03 | End: 2018-12-04 | Stop reason: HOSPADM

## 2018-12-03 RX ORDER — ONDANSETRON 4 MG/1
4 TABLET, ORALLY DISINTEGRATING ORAL EVERY 6 HOURS PRN
Status: DISCONTINUED | OUTPATIENT
Start: 2018-12-03 | End: 2018-12-04 | Stop reason: HOSPADM

## 2018-12-03 RX ORDER — LATANOPROST 50 UG/ML
1 SOLUTION/ DROPS OPHTHALMIC NIGHTLY
Status: DISCONTINUED | OUTPATIENT
Start: 2018-12-03 | End: 2018-12-04 | Stop reason: HOSPADM

## 2018-12-03 RX ORDER — ALBUTEROL SULFATE 2.5 MG/3ML
2.5 SOLUTION RESPIRATORY (INHALATION) EVERY 6 HOURS PRN
Status: DISCONTINUED | OUTPATIENT
Start: 2018-12-03 | End: 2018-12-04 | Stop reason: HOSPADM

## 2018-12-03 RX ORDER — ACETAMINOPHEN 325 MG/1
650 TABLET ORAL EVERY 4 HOURS PRN
Status: DISCONTINUED | OUTPATIENT
Start: 2018-12-03 | End: 2018-12-04 | Stop reason: HOSPADM

## 2018-12-03 RX ORDER — NITROGLYCERIN 0.4 MG/1
TABLET SUBLINGUAL AS NEEDED
Status: DISCONTINUED | OUTPATIENT
Start: 2018-12-03 | End: 2018-12-03 | Stop reason: HOSPADM

## 2018-12-03 RX ORDER — HEPARIN SODIUM 1000 [USP'U]/ML
INJECTION, SOLUTION INTRAVENOUS; SUBCUTANEOUS AS NEEDED
Status: DISCONTINUED | OUTPATIENT
Start: 2018-12-03 | End: 2018-12-03 | Stop reason: HOSPADM

## 2018-12-03 RX ORDER — SODIUM CHLORIDE 9 MG/ML
100 INJECTION, SOLUTION INTRAVENOUS ONCE
Status: COMPLETED | OUTPATIENT
Start: 2018-12-03 | End: 2018-12-03

## 2018-12-03 RX ORDER — TIMOLOL MALEATE 5 MG/ML
1 SOLUTION/ DROPS OPHTHALMIC 2 TIMES DAILY
COMMUNITY

## 2018-12-03 RX ORDER — TRAMADOL HYDROCHLORIDE 50 MG/1
50 TABLET ORAL 3 TIMES DAILY
Status: DISCONTINUED | OUTPATIENT
Start: 2018-12-03 | End: 2018-12-04 | Stop reason: HOSPADM

## 2018-12-03 RX ORDER — NIFEDIPINE 30 MG/1
30 TABLET, FILM COATED, EXTENDED RELEASE ORAL DAILY
Status: DISCONTINUED | OUTPATIENT
Start: 2018-12-04 | End: 2018-12-04 | Stop reason: HOSPADM

## 2018-12-03 RX ORDER — ATORVASTATIN CALCIUM 40 MG/1
40 TABLET, FILM COATED ORAL NIGHTLY
COMMUNITY

## 2018-12-03 RX ORDER — LIDOCAINE HYDROCHLORIDE 20 MG/ML
INJECTION, SOLUTION INFILTRATION; PERINEURAL AS NEEDED
Status: DISCONTINUED | OUTPATIENT
Start: 2018-12-03 | End: 2018-12-03 | Stop reason: HOSPADM

## 2018-12-03 RX ORDER — ONDANSETRON 2 MG/ML
4 INJECTION INTRAMUSCULAR; INTRAVENOUS EVERY 6 HOURS PRN
Status: DISCONTINUED | OUTPATIENT
Start: 2018-12-03 | End: 2018-12-04 | Stop reason: HOSPADM

## 2018-12-03 RX ORDER — TIMOLOL MALEATE 5 MG/ML
1 SOLUTION/ DROPS OPHTHALMIC 2 TIMES DAILY
Status: DISCONTINUED | OUTPATIENT
Start: 2018-12-03 | End: 2018-12-04 | Stop reason: HOSPADM

## 2018-12-03 RX ORDER — TIMOLOL MALEATE 5 MG/ML
1 SOLUTION/ DROPS OPHTHALMIC 2 TIMES DAILY
Status: CANCELLED | OUTPATIENT
Start: 2018-12-03

## 2018-12-03 RX ORDER — LATANOPROST 50 UG/ML
1 SOLUTION/ DROPS OPHTHALMIC NIGHTLY
Status: CANCELLED | OUTPATIENT
Start: 2018-12-03

## 2018-12-03 RX ORDER — ATORVASTATIN CALCIUM 40 MG/1
40 TABLET, FILM COATED ORAL NIGHTLY
Status: CANCELLED | OUTPATIENT
Start: 2018-12-03

## 2018-12-03 RX ORDER — PROTAMINE SULFATE 10 MG/ML
25 INJECTION, SOLUTION INTRAVENOUS ONCE
Status: COMPLETED | OUTPATIENT
Start: 2018-12-03 | End: 2018-12-03

## 2018-12-03 RX ORDER — ASPIRIN 81 MG/1
81 TABLET ORAL DAILY
Status: DISCONTINUED | OUTPATIENT
Start: 2018-12-04 | End: 2018-12-04 | Stop reason: HOSPADM

## 2018-12-03 RX ORDER — FENTANYL CITRATE 50 UG/ML
INJECTION, SOLUTION INTRAMUSCULAR; INTRAVENOUS AS NEEDED
Status: DISCONTINUED | OUTPATIENT
Start: 2018-12-03 | End: 2018-12-03 | Stop reason: HOSPADM

## 2018-12-03 RX ORDER — TRAMADOL HYDROCHLORIDE 50 MG/1
50 TABLET ORAL 3 TIMES DAILY
Status: CANCELLED | OUTPATIENT
Start: 2018-12-03

## 2018-12-03 RX ORDER — LORAZEPAM 2 MG/ML
1 INJECTION INTRAMUSCULAR EVERY 6 HOURS PRN
Status: DISCONTINUED | OUTPATIENT
Start: 2018-12-03 | End: 2018-12-04 | Stop reason: HOSPADM

## 2018-12-03 RX ORDER — ATORVASTATIN CALCIUM 40 MG/1
40 TABLET, FILM COATED ORAL NIGHTLY
Status: DISCONTINUED | OUTPATIENT
Start: 2018-12-03 | End: 2018-12-04 | Stop reason: HOSPADM

## 2018-12-03 RX ORDER — SODIUM CHLORIDE 9 MG/ML
INJECTION, SOLUTION INTRAVENOUS CONTINUOUS PRN
Status: COMPLETED | OUTPATIENT
Start: 2018-12-03 | End: 2018-12-03

## 2018-12-03 RX ORDER — NICOTINE 21 MG/24HR
1 PATCH, TRANSDERMAL 24 HOURS TRANSDERMAL DAILY
Status: DISCONTINUED | OUTPATIENT
Start: 2018-12-03 | End: 2018-12-04 | Stop reason: HOSPADM

## 2018-12-03 RX ORDER — TRAMADOL HYDROCHLORIDE 50 MG/1
50 TABLET ORAL 3 TIMES DAILY
COMMUNITY
End: 2019-07-30 | Stop reason: ALTCHOICE

## 2018-12-03 RX ORDER — CLOPIDOGREL BISULFATE 75 MG/1
300 TABLET ORAL ONCE
Status: COMPLETED | OUTPATIENT
Start: 2018-12-03 | End: 2018-12-03

## 2018-12-03 RX ORDER — LATANOPROST 50 UG/ML
1 SOLUTION/ DROPS OPHTHALMIC NIGHTLY
COMMUNITY

## 2018-12-03 RX ORDER — SODIUM CHLORIDE 9 MG/ML
250 INJECTION, SOLUTION INTRAVENOUS ONCE AS NEEDED
Status: DISCONTINUED | OUTPATIENT
Start: 2018-12-03 | End: 2018-12-04 | Stop reason: HOSPADM

## 2018-12-03 RX ORDER — MIDAZOLAM HYDROCHLORIDE 1 MG/ML
INJECTION INTRAMUSCULAR; INTRAVENOUS AS NEEDED
Status: DISCONTINUED | OUTPATIENT
Start: 2018-12-03 | End: 2018-12-03 | Stop reason: HOSPADM

## 2018-12-03 RX ORDER — ONDANSETRON 4 MG/1
4 TABLET, FILM COATED ORAL EVERY 6 HOURS PRN
Status: DISCONTINUED | OUTPATIENT
Start: 2018-12-03 | End: 2018-12-04 | Stop reason: HOSPADM

## 2018-12-03 RX ADMIN — IPRATROPIUM BROMIDE 0.5 MG: 0.5 SOLUTION RESPIRATORY (INHALATION) at 19:25

## 2018-12-03 RX ADMIN — PROPRANOLOL HYDROCHLORIDE 20 MG: 20 TABLET ORAL at 14:29

## 2018-12-03 RX ADMIN — TRAMADOL HYDROCHLORIDE 50 MG: 50 TABLET, COATED ORAL at 21:58

## 2018-12-03 RX ADMIN — PROPRANOLOL HYDROCHLORIDE 20 MG: 20 TABLET ORAL at 21:58

## 2018-12-03 RX ADMIN — CLOPIDOGREL 300 MG: 75 TABLET, FILM COATED ORAL at 23:46

## 2018-12-03 RX ADMIN — PROTAMINE SULFATE 25 MG: 10 INJECTION, SOLUTION INTRAVENOUS at 10:54

## 2018-12-03 RX ADMIN — SODIUM CHLORIDE 100 ML/HR: 9 INJECTION, SOLUTION INTRAVENOUS at 14:23

## 2018-12-03 RX ADMIN — ATORVASTATIN CALCIUM 40 MG: 40 TABLET, FILM COATED ORAL at 21:57

## 2018-12-03 RX ADMIN — TIMOLOL MALEATE 1 DROP: 5 SOLUTION OPHTHALMIC at 21:58

## 2018-12-03 RX ADMIN — TRAMADOL HYDROCHLORIDE 50 MG: 50 TABLET, COATED ORAL at 16:02

## 2018-12-03 RX ADMIN — NICOTINE 1 PATCH: 21 PATCH TRANSDERMAL at 14:30

## 2018-12-03 NOTE — NURSING NOTE
Spoke with Dr. Ely. Says we can attempt to remove fem stop at 15:30, but to assess site every 15 minutes for first hour. However patient is to remain strict bedrest for full 8 hours, until 9pm

## 2018-12-03 NOTE — PLAN OF CARE
Problem: Skin Injury Risk (Adult)  Goal: Identify Related Risk Factors and Signs and Symptoms  Outcome: Ongoing (interventions implemented as appropriate)    Goal: Skin Health and Integrity  Outcome: Ongoing (interventions implemented as appropriate)      Problem: Cardiac Catheterization (Diagnostic/Interventional) (Adult)  Goal: Signs and Symptoms of Listed Potential Problems Will be Absent, Minimized or Managed (Cardiac Catheterization)  Outcome: Ongoing (interventions implemented as appropriate)

## 2018-12-04 ENCOUNTER — APPOINTMENT (OUTPATIENT)
Dept: CARDIOLOGY | Facility: HOSPITAL | Age: 58
End: 2018-12-04
Attending: INTERNAL MEDICINE

## 2018-12-04 VITALS
WEIGHT: 135.6 LBS | TEMPERATURE: 98 F | OXYGEN SATURATION: 96 % | HEIGHT: 60 IN | SYSTOLIC BLOOD PRESSURE: 106 MMHG | HEART RATE: 61 BPM | DIASTOLIC BLOOD PRESSURE: 65 MMHG | BODY MASS INDEX: 26.62 KG/M2 | RESPIRATION RATE: 18 BRPM

## 2018-12-04 LAB
ANION GAP SERPL CALCULATED.3IONS-SCNC: 4.7 MMOL/L (ref 3.6–11.2)
BUN BLD-MCNC: 16 MG/DL (ref 7–21)
BUN/CREAT SERPL: 17.4 (ref 7–25)
CALCIUM SPEC-SCNC: 9.6 MG/DL (ref 7.7–10)
CHLORIDE SERPL-SCNC: 108 MMOL/L (ref 99–112)
CO2 SERPL-SCNC: 23.3 MMOL/L (ref 24.3–31.9)
CREAT BLD-MCNC: 0.92 MG/DL (ref 0.43–1.29)
DEPRECATED RDW RBC AUTO: 49 FL (ref 37–54)
ERYTHROCYTE [DISTWIDTH] IN BLOOD BY AUTOMATED COUNT: 15.1 % (ref 11.5–14.5)
GFR SERPL CREATININE-BSD FRML MDRD: 62 ML/MIN/1.73
GLUCOSE BLD-MCNC: 122 MG/DL (ref 70–110)
HCT VFR BLD AUTO: 38.2 % (ref 37–47)
HGB BLD-MCNC: 11.8 G/DL (ref 12–16)
MCH RBC QN AUTO: 28.5 PG (ref 27–33)
MCHC RBC AUTO-ENTMCNC: 30.9 G/DL (ref 33–37)
MCV RBC AUTO: 92.3 FL (ref 80–94)
OSMOLALITY SERPL CALC.SUM OF ELEC: 274.5 MOSM/KG (ref 273–305)
PLATELET # BLD AUTO: 399 10*3/MM3 (ref 130–400)
PMV BLD AUTO: 10 FL (ref 6–10)
POTASSIUM BLD-SCNC: 3.7 MMOL/L (ref 3.5–5.3)
RBC # BLD AUTO: 4.14 10*6/MM3 (ref 4.2–5.4)
SODIUM BLD-SCNC: 136 MMOL/L (ref 135–153)
WBC NRBC COR # BLD: 8.02 10*3/MM3 (ref 4.5–12.5)

## 2018-12-04 PROCEDURE — 80048 BASIC METABOLIC PNL TOTAL CA: CPT | Performed by: INTERNAL MEDICINE

## 2018-12-04 PROCEDURE — 93926 LOWER EXTREMITY STUDY: CPT | Performed by: RADIOLOGY

## 2018-12-04 PROCEDURE — 99214 OFFICE O/P EST MOD 30 MIN: CPT | Performed by: NURSE PRACTITIONER

## 2018-12-04 PROCEDURE — 93926 LOWER EXTREMITY STUDY: CPT

## 2018-12-04 PROCEDURE — 94799 UNLISTED PULMONARY SVC/PX: CPT

## 2018-12-04 PROCEDURE — G0378 HOSPITAL OBSERVATION PER HR: HCPCS

## 2018-12-04 PROCEDURE — 85027 COMPLETE CBC AUTOMATED: CPT | Performed by: INTERNAL MEDICINE

## 2018-12-04 RX ORDER — CLOPIDOGREL BISULFATE 75 MG/1
75 TABLET ORAL DAILY
Qty: 30 TABLET | Refills: 11 | Status: SHIPPED | OUTPATIENT
Start: 2018-12-04

## 2018-12-04 RX ADMIN — ASPIRIN 81 MG: 81 TABLET ORAL at 09:23

## 2018-12-04 RX ADMIN — TIMOLOL MALEATE 1 DROP: 5 SOLUTION OPHTHALMIC at 09:24

## 2018-12-04 RX ADMIN — NIFEDIPINE 30 MG: 30 TABLET, EXTENDED RELEASE ORAL at 09:24

## 2018-12-04 RX ADMIN — NICOTINE 1 PATCH: 21 PATCH TRANSDERMAL at 09:25

## 2018-12-04 RX ADMIN — TRAMADOL HYDROCHLORIDE 50 MG: 50 TABLET, COATED ORAL at 15:04

## 2018-12-04 RX ADMIN — PROPRANOLOL HYDROCHLORIDE 20 MG: 20 TABLET ORAL at 15:02

## 2018-12-04 RX ADMIN — IPRATROPIUM BROMIDE 0.5 MG: 0.5 SOLUTION RESPIRATORY (INHALATION) at 12:30

## 2018-12-04 RX ADMIN — IPRATROPIUM BROMIDE 0.5 MG: 0.5 SOLUTION RESPIRATORY (INHALATION) at 06:38

## 2018-12-04 RX ADMIN — PROPRANOLOL HYDROCHLORIDE 20 MG: 20 TABLET ORAL at 06:54

## 2018-12-04 RX ADMIN — LISINOPRIL: 10 TABLET ORAL at 09:23

## 2018-12-04 RX ADMIN — TRAMADOL HYDROCHLORIDE 50 MG: 50 TABLET, COATED ORAL at 09:24

## 2018-12-04 NOTE — PLAN OF CARE
Problem: Patient Care Overview  Goal: Individualization and Mutuality  Outcome: Ongoing (interventions implemented as appropriate)    Goal: Discharge Needs Assessment  Outcome: Ongoing (interventions implemented as appropriate)    Goal: Interprofessional Rounds/Family Conf  Outcome: Ongoing (interventions implemented as appropriate)      Problem: Skin Injury Risk (Adult)  Goal: Skin Health and Integrity  Outcome: Ongoing (interventions implemented as appropriate)      Problem: Cardiac Catheterization (Diagnostic/Interventional) (Adult)  Goal: Signs and Symptoms of Listed Potential Problems Will be Absent, Minimized or Managed (Cardiac Catheterization)  Outcome: Ongoing (interventions implemented as appropriate)

## 2018-12-04 NOTE — DISCHARGE INSTR - APPOINTMENTS
Dr Fried December 19th at 11:30am    Dr Ely January 3rd at 2:45pm. Soonest available appointment.

## 2018-12-04 NOTE — PLAN OF CARE
Problem: Patient Care Overview  Goal: Plan of Care Review  Outcome: Ongoing (interventions implemented as appropriate)   12/04/18 0331   Coping/Psychosocial   Plan of Care Reviewed With patient   Plan of Care Review   Progress improving     Goal: Discharge Needs Assessment  Outcome: Ongoing (interventions implemented as appropriate)      Problem: Skin Injury Risk (Adult)  Goal: Identify Related Risk Factors and Signs and Symptoms  Outcome: Outcome(s) achieved Date Met: 12/04/18    Goal: Skin Health and Integrity  Outcome: Ongoing (interventions implemented as appropriate)      Problem: Cardiac Catheterization (Diagnostic/Interventional) (Adult)  Goal: Signs and Symptoms of Listed Potential Problems Will be Absent, Minimized or Managed (Cardiac Catheterization)  Outcome: Ongoing (interventions implemented as appropriate)

## 2018-12-05 NOTE — PROGRESS NOTES
Continued Stay Note   Jordy     Patient Name: Felicia Contreras  MRN: [id number]  Today's Date: 12/5/2018    Admit Date: 12/3/2018    Discharge Plan     Row Name 12/05/18 0733       Plan    Final Discharge Disposition Code  01 - home or self-care    Final Note  Patient discharged 12-4-18.  No needs identified.        Discharge Codes    No documentation.       Expected Discharge Date and Time     Expected Discharge Date Expected Discharge Time    Dec 4, 2018             Ritika Davis RN

## 2018-12-13 DIAGNOSIS — I10 ESSENTIAL HYPERTENSION: ICD-10-CM

## 2018-12-13 RX ORDER — NIFEDIPINE 30 MG/1
TABLET, EXTENDED RELEASE ORAL
Qty: 30 TABLET | Refills: 0 | Status: SHIPPED | OUTPATIENT
Start: 2018-12-13 | End: 2019-02-16 | Stop reason: SDUPTHER

## 2018-12-13 RX ORDER — LISINOPRIL AND HYDROCHLOROTHIAZIDE 12.5; 1 MG/1; MG/1
TABLET ORAL
Qty: 30 TABLET | Refills: 0 | Status: SHIPPED | OUTPATIENT
Start: 2018-12-13 | End: 2019-02-16 | Stop reason: SDUPTHER

## 2018-12-15 NOTE — DISCHARGE SUMMARY
"Patient Identification  Name:  Felicia Contreras  Age:  58 y.o.  Sex:  female  :  1960  MRN:  [id number]  Visit Number:  [id number]    Date of Admission: 12/3/2018  Date of Discharge: 2018    PCP: Jules Fried MD    DISCHARGE DIAGNOSIS  Peripheral artery disease  Nonhealing ischemic ulcers of the left first second and third toes and heel  Occlusion of bilateral SFA per ultrasound  History of carotid stenosis with left carotid endarterectomy  Left carotid bruit  Essential hypertension  Hyperlipidemia  Tobacco use    CONSULTS   None    PROCEDURES PERFORMED  Right femoral artery access  Aortogram with bilateral lower extremity runoff  Atherectomy and DCB be to the left SFA occlusion    HOSPITAL COURSE  Patient is a 58 y.o. female with a past medical history significant for carotid artery disease, hyperlipidemia, hypertension.  A ship presented to the clinic with report of left lower extremity ulcers which were nonhealing.  She was admitted for left lower extremity arteriogram.      After successful atherectomy she was transferred to the floor.  Her vital signs were monitored and right femoral access site was monitored.  She developed hematoma.  Duplex of the lower extremity revealed no pseudoaneurysm and no measurable hematoma.  She remained stable.  Hemoglobin remained stable.      She will be discharged home to self-care.  She was counseled regarding risk factor modification including tobacco cessation, diet, and exercise.  He will return to clinic in a week.  She will see her primary care provider in 2-4 weeks.      CONDITION ON DISCHARGE  Stable.    VITAL SIGNS  /65   Pulse 61   Temp 98 °F (36.7 °C)   Resp 18   Ht 152.4 cm (60\")   Wt 61.5 kg (135 lb 9.6 oz)   SpO2 96%   BMI 26.48 kg/m²     DISCHARGE PHYSICAL EXAM  Physical Exam   Constitutional: She is oriented to person, place, and time. Vital signs are normal. She appears well-developed and well-nourished. She is cooperative.   HENT: "   Head: Normocephalic and atraumatic.   Eyes: Conjunctivae and lids are normal. Pupils are equal, round, and reactive to light.   Neck: No JVD present. Carotid bruit is not present.   Cardiovascular: Normal rate, regular rhythm, normal heart sounds and intact distal pulses. PMI is not displaced.   Pulses:       Dorsalis pedis pulses are 1+ on the right side, and 1+ on the left side.        Posterior tibial pulses are 1+ on the right side, and 1+ on the left side.   Right femoral access site: Mod ecchymosis.  Pulses intact.     Pulmonary/Chest: Effort normal and breath sounds normal. No respiratory distress. She has no decreased breath sounds. She has no wheezes. She has no rhonchi. She has no rales.   Abdominal: Soft. Normal appearance and bowel sounds are normal. She exhibits no distension. There is no tenderness.   Neurological: She is alert and oriented to person, place, and time.   Skin: Skin is warm and dry.   Psychiatric: She has a normal mood and affect. Cognition and memory are normal.   Vitals reviewed.      RESULTS REVIEW    DISCHARGE DISPOSITION   Home or Self Care    DISCHARGE MEDICATIONS     Discharge Medications      New Medications      Instructions Start Date   clopidogrel 75 MG tablet  Commonly known as:  PLAVIX   75 mg, Oral, Daily         Continue These Medications      Instructions Start Date   albuterol 108 (90 Base) MCG/ACT inhaler  Commonly known as:  PROVENTIL HFA;VENTOLIN HFA;PROAIR HFA   2 puffs, Inhalation, Every 4 Hours PRN      aspirin 81 MG EC tablet   81 mg, Oral, Daily      atorvastatin 40 MG tablet  Commonly known as:  LIPITOR   40 mg, Oral, Nightly      latanoprost 0.005 % ophthalmic solution  Commonly known as:  XALATAN   1 drop, Both Eyes, Nightly      nicotine 21 MG/24HR patch  Commonly known as:  NICODERM CQ   1 patch, Transdermal, Every 24 Hours      propranolol 20 MG tablet  Commonly known as:  INDERAL   20 mg, Oral, 3 Times Daily      SPIRIVA RESPIMAT 2.5 MCG/ACT aerosol  solution inhaler  Generic drug:  tiotropium bromide monohydrate   2 puffs, Inhalation, Daily - RT      timolol 0.5 % ophthalmic solution  Commonly known as:  TIMOPTIC   1 drop, Both Eyes, 2 Times Daily      traMADol 50 MG tablet  Commonly known as:  ULTRAM   50 mg, Oral, 3 Times Daily             Diet Instructions     Diet: Regular      Discharge Diet:  Regular           Activity Instructions     Activity as Tolerated            Your Scheduled Appointments    Jan 03, 2019  2:45 PM EST  Follow Up with Brian Ely MD  [facility] CARDIOLOGY (--) [address]  [place] [address] [phone]   Arrive 15 minutes prior to appointment.   Jan 17, 2019  1:15 PM EST  Follow Up with MAC Beltran  [facility] CARDIOLOGY (--) [address] [place] [address] [phone]   Arrive 15 minutes prior to appointment.    Additional instructions:      Dr Fried December 19th at 11:30am    Dr Ely January 3rd at 2:45pm. Soonest available appointment.                     Additional Instructions for the Follow-ups that You Need to Schedule     Discharge Follow-up with PCP   As directed       Currently Documented PCP:    Jules Fried MD    PCP Phone Number:    [phone]     Follow Up Details:  2-4 weeks         Discharge Follow-up with Specialty: Dr. Brian Ely; 2 Weeks   As directed      Specialty:  Dr. Brian Ely    Follow Up:  2 Weeks               TEST  RESULTS PENDING AT DISCHARGE         MAC Angel   12/15/18  1:32 PM      Time: less than than 30 minutes.

## 2019-01-30 ENCOUNTER — OFFICE VISIT (OUTPATIENT)
Dept: CARDIOLOGY | Facility: CLINIC | Age: 59
End: 2019-01-30

## 2019-01-30 VITALS
HEART RATE: 60 BPM | DIASTOLIC BLOOD PRESSURE: 62 MMHG | HEIGHT: 60 IN | SYSTOLIC BLOOD PRESSURE: 112 MMHG | WEIGHT: 131 LBS | BODY MASS INDEX: 25.72 KG/M2

## 2019-01-30 DIAGNOSIS — Z98.890 H/O CAROTID ENDARTERECTOMY: ICD-10-CM

## 2019-01-30 DIAGNOSIS — Z72.0 TOBACCO ABUSE: ICD-10-CM

## 2019-01-30 DIAGNOSIS — J44.9 COPD MIXED TYPE (HCC): ICD-10-CM

## 2019-01-30 DIAGNOSIS — I73.9 PAD (PERIPHERAL ARTERY DISEASE) (HCC): ICD-10-CM

## 2019-01-30 DIAGNOSIS — I10 ESSENTIAL HYPERTENSION: ICD-10-CM

## 2019-01-30 DIAGNOSIS — I65.23 BILATERAL CAROTID ARTERY STENOSIS: Primary | ICD-10-CM

## 2019-01-30 DIAGNOSIS — E78.00 HYPERCHOLESTEREMIA: ICD-10-CM

## 2019-01-30 PROCEDURE — 99213 OFFICE O/P EST LOW 20 MIN: CPT | Performed by: NURSE PRACTITIONER

## 2019-01-30 RX ORDER — NICOTINE 21 MG/24HR
1 PATCH, TRANSDERMAL 24 HOURS TRANSDERMAL EVERY 24 HOURS
Qty: 30 PATCH | Refills: 3 | Status: SHIPPED | OUTPATIENT
Start: 2019-01-30

## 2019-02-16 DIAGNOSIS — I10 ESSENTIAL HYPERTENSION: ICD-10-CM

## 2019-02-18 RX ORDER — NIFEDIPINE 30 MG/1
TABLET, EXTENDED RELEASE ORAL
Qty: 30 TABLET | Refills: 2 | Status: SHIPPED | OUTPATIENT
Start: 2019-02-18 | End: 2019-05-23 | Stop reason: SDUPTHER

## 2019-02-18 RX ORDER — LISINOPRIL AND HYDROCHLOROTHIAZIDE 12.5; 1 MG/1; MG/1
TABLET ORAL
Qty: 30 TABLET | Refills: 2 | Status: SHIPPED | OUTPATIENT
Start: 2019-02-18 | End: 2019-05-23 | Stop reason: SDUPTHER

## 2019-03-13 ENCOUNTER — OFFICE VISIT (OUTPATIENT)
Dept: CARDIOLOGY | Facility: CLINIC | Age: 59
End: 2019-03-13

## 2019-03-13 VITALS
WEIGHT: 127.4 LBS | HEIGHT: 60 IN | DIASTOLIC BLOOD PRESSURE: 73 MMHG | OXYGEN SATURATION: 100 % | HEART RATE: 65 BPM | SYSTOLIC BLOOD PRESSURE: 158 MMHG | BODY MASS INDEX: 25.01 KG/M2

## 2019-03-13 DIAGNOSIS — I73.9 PAD (PERIPHERAL ARTERY DISEASE) (HCC): ICD-10-CM

## 2019-03-13 DIAGNOSIS — I65.23 BILATERAL CAROTID ARTERY STENOSIS: Primary | ICD-10-CM

## 2019-03-13 DIAGNOSIS — I10 ESSENTIAL HYPERTENSION: ICD-10-CM

## 2019-03-13 DIAGNOSIS — E78.00 HYPERCHOLESTEREMIA: ICD-10-CM

## 2019-03-13 PROCEDURE — 99214 OFFICE O/P EST MOD 30 MIN: CPT | Performed by: INTERNAL MEDICINE

## 2019-03-13 RX ORDER — RANITIDINE 150 MG/1
150 TABLET ORAL 2 TIMES DAILY
Refills: 2 | COMMUNITY
Start: 2019-02-25

## 2019-03-13 RX ORDER — MIRTAZAPINE 30 MG/1
TABLET, FILM COATED ORAL
Refills: 5 | COMMUNITY
Start: 2019-03-05

## 2019-03-13 RX ORDER — OXYCODONE AND ACETAMINOPHEN 7.5; 325 MG/1; MG/1
1 TABLET ORAL EVERY 6 HOURS PRN
Qty: 30 TABLET | Refills: 0 | Status: SHIPPED | OUTPATIENT
Start: 2019-03-13 | End: 2019-04-18

## 2019-03-13 RX ORDER — CHLORAL HYDRATE 500 MG
1 CAPSULE ORAL 2 TIMES DAILY
Refills: 5 | COMMUNITY
Start: 2019-01-03 | End: 2019-07-30 | Stop reason: ALTCHOICE

## 2019-03-13 RX ORDER — CHOLECALCIFEROL (VITAMIN D3) 1250 MCG
CAPSULE ORAL
Refills: 5 | COMMUNITY
Start: 2019-02-25 | End: 2019-07-30 | Stop reason: SDUPTHER

## 2019-03-13 RX ORDER — ALPRAZOLAM 0.5 MG/1
0.5 TABLET ORAL DAILY PRN
Refills: 0 | COMMUNITY
Start: 2019-02-08

## 2019-03-13 RX ORDER — BRINZOLAMIDE/BRIMONIDINE TARTRATE 10; 2 MG/ML; MG/ML
SUSPENSION/ DROPS OPHTHALMIC
Refills: 5 | COMMUNITY
Start: 2019-02-25

## 2019-03-13 RX ORDER — METHAZOLAMIDE 25 MG/1
50 TABLET ORAL 2 TIMES DAILY
Refills: 12 | COMMUNITY
Start: 2019-03-11

## 2019-03-13 RX ORDER — ATROPINE SULFATE 10 MG/ML
SOLUTION/ DROPS OPHTHALMIC
Refills: 1 | COMMUNITY
Start: 2019-01-30

## 2019-03-13 NOTE — H&P (VIEW-ONLY)
[facility] CARDIOLOGY  [address] [place] [id number]  Phone: [phone]  Fax: [phone]    03/13/2019    Chief Complaint   Patient presents with   • Peripheral Vascular Disease        History:   Felicia Contreras is a 58 y.o. female seen in follow-up for a left arteriogram that was performed on 12/03/18. Patient states that she is doing much better since the procedure and states that the sores on her feet are healing up. She admits that she is still currently smoking but she is trying to quit. She also states that she does not have much pain with her leg anymore but does complain with her right leg bothering her. She also states that she does a sore on her right foot now and would like to discuss her options with pain medication.  She had left leg treated and now her right leg has an ulcer and with pain.    Past Medical History:   Diagnosis Date   • History of hysterectomy    • Hyperlipidemia    • Hypertension    • Status post carotid surgery 06/11/2018       Past Surgical History:   Procedure Laterality Date   • CARDIAC CATHETERIZATION N/A 12/3/2018    Procedure: Atherectomy-peripheral;  Surgeon: Brian Ely MD;  Location: [facility] CATH INVASIVE LOCATION;  Service: Interventional Radiology   • CARDIOVASCULAR STRESS TEST  04/30/2018    L. Cardiolite- Negative   • ECHO - CONVERTED  04/30/2018    TLS. EF 65%. RVSP- 18 mmHg   • INTERVENTIONAL RADIOLOGY PROCEDURE Bilateral 12/3/2018    Procedure: ANGIOGRAM EXTREMITY BILATERAL;  Surgeon: Brian Ely MD;  Location:  [facility] CATH INVASIVE LOCATION;  Service: Interventional Radiology   • INTERVENTIONAL RADIOLOGY PROCEDURE N/A 12/3/2018    Procedure: IR PTA each additional peripheral artery;  Surgeon: Brian Ely MD;  Location:  [facility] CATH INVASIVE LOCATION;  Service: Interventional Radiology   • OTHER SURGICAL HISTORY  04/12/2018    CTA- 75% (L) ICA. 100% (R) ICA. 100% (R) Verteberal artery.. Small aneurysm of Basilar Artery          Past Social History:  Social History     Socioeconomic History   • Marital status:      Spouse name: Not on file   • Number of children: Not on file   • Years of education: Not on file   • Highest education level: Not on file   Tobacco Use   • Smoking status: Current Every Day Smoker     Packs/day: 0.50     Years: 40.00     Pack years: 20.00     Types: Cigarettes     Start date: 4/19/1978   • Smokeless tobacco: Never Used   Substance and Sexual Activity   • Alcohol use: No     Comment: rarely drinks a glass of wine    • Drug use: Yes     Types: Marijuana     Comment: has used marijuana in the past.   • Sexual activity: Defer       Past Family History:  Family History   Problem Relation Age of Onset   • Hypertension Mother    • No Known Problems Sister    • Stroke Maternal Grandmother    • No Known Problems Sister        Review of Systems:   Please see HPI  Constitution: No chills, no rigors, no unexplained weight loss or weight gain  Eyes:  No diplopia, no blurred vision, no loss of vision, conjunctiva is pink and sclera is anicteric  ENT:  No tinnitus, no otorrhea, no epistaxis, no sore throat   Respiratory: No cough, no hemoptysis  Cardiovascular: see HPI  Gastrointestinal: No nausea, no vomiting, no hematemesis, no diarrhea or constipation, no melena  Genitourinary: No frequency of dysuria no hematuria  Integument: No pruritis and  no skin rash  Hematologic / Lymphatic: No excessive bleeding, easy bruising, fatigue, lymphadenopathy and petechiae  Musculoskeletal: No joint pain, joint stiffness, joint swelling, muscle pain, muscle weakness and neck pain  Neurological: No dizziness, headaches, light headedness, seizures and vertigo  Endocrine: No frequent urination and nocturia, temperature intolerance, weight gain, unintended and weight loss, unintended      Current Outpatient Medications   Medication Sig Dispense Refill   • albuterol (PROVENTIL HFA;VENTOLIN HFA) 108 (90 Base) MCG/ACT inhaler Inhale 2  puffs Every 4 (Four) Hours As Needed for Wheezing.     • ALPRAZolam (XANAX) 0.5 MG tablet Take 0.5 mg by mouth Daily As Needed.  0   • aspirin 81 MG EC tablet Take 81 mg by mouth Daily.     • atorvastatin (LIPITOR) 40 MG tablet Take 40 mg by mouth Every Night.     • atropine 1 % ophthalmic solution INSTILL 1 DROP INTO THE LEFT EYE TWICE DAILY  1   • Cholecalciferol (VITAMIN D3) 5000 units capsule capsule Take 5,000 Units by mouth 1 (One) Time Per Week.     • Cholecalciferol (VITAMIN D3) 28451 units capsule TAKE 1 CAPSULE ONCE A WEEK  5   • clopidogrel (PLAVIX) 75 MG tablet Take 1 tablet by mouth Daily. 30 tablet 11   • latanoprost (XALATAN) 0.005 % ophthalmic solution Administer 1 drop to both eyes Every Night.     • lisinopril-hydrochlorothiazide (PRINZIDE,ZESTORETIC) 10-12.5 MG per tablet TAKE 1 TABLET DAILY 30 tablet 2   • methazolAMIDE (NEPTAZANE) 25 MG tablet Take 50 mg by mouth 2 (Two) Times a Day.  12   • mirtazapine (REMERON) 30 MG tablet TAKE 1 TABLET ONCE DAILY AT BEDTIME  5   • nicotine (NICODERM CQ) 21 MG/24HR patch Place 1 patch on the skin as directed by provider Daily. 30 patch 3   • NIFEdipine XL (PROCARDIA XL) 30 MG 24 hr tablet TAKE 1 TABLET DAILY 30 tablet 2   • Omega-3 Fatty Acids (FISH OIL) 1000 MG capsule capsule Take 1 capsule by mouth 2 (Two) Times a Day.  5   • propranolol (INDERAL) 20 MG tablet Take 20 mg by mouth 3 (Three) Times a Day.     • raNITIdine (ZANTAC) 150 MG tablet Take 150 mg by mouth 2 (Two) Times a Day.  2   • SIMBRINZA 1-0.2 % suspension INSTILL 1 DROP IN EACH EYE 3 TIMES DAILY  5   • timolol (TIMOPTIC) 0.5 % ophthalmic solution Administer 1 drop to both eyes 2 (Two) Times a Day.     • tiotropium bromide monohydrate (SPIRIVA RESPIMAT) 2.5 MCG/ACT aerosol solution inhaler Inhale 2 puffs Daily.     • traMADol (ULTRAM) 50 MG tablet Take 50 mg by mouth 3 (Three) Times a Day.       No current facility-administered medications for this visit.         No Known  Allergies    Objective:  Vitals:    03/13/19 1454   BP: 158/73   Pulse: 65   SpO2: 100%     Comfortable NAD  PERRL, conjunctiva clear  Neck supple, no JVD or thyromegaly appreciated  S1/S2 RRR, no m/r/g  Lungs CTA B, normal effort  Abdomen S/NT/ND (+) BS, no HSM appreciated  Extremities warm, no clubbing, cyanosis, or edema  Normal gait  No visible or palpable skin lesions  A/Ox4, mood and affect appropriate  Pulse exam:    Feet are cold on right and warm on left  Capillary refill is normal  No evidence of ulceration or color change of the toes  PULSES  Right DP and PT are 1+ and Left DP and PT are 0+  Ulcers on left foot are healed  Right lateral foot has a non-healing ulcer    DATA:      Results for orders placed during the hospital encounter of 04/30/18   Adult Transthoracic Echo Complete W/ Cont if Necessary Per Protocol      Results for orders placed during the hospital encounter of 04/30/18   Stress Test With Myocardial Perfusion One Day      Results for orders placed during the hospital encounter of 04/30/18   Stress Test With Myocardial Perfusion One Day      Results for orders placed during the hospital encounter of 12/03/18   Cardiac Catheterization/Vascular Study    Narrative PERIPHERAL ARTERIOGRAM / INTERVENTION REPORT     DATE OF PROCEDURE: 12/03/18     INDICATION FOR PROCEDURE: Severe claudication of the left leg.    :  MD Bart Alexander MD       PROCEDURE PERFORMED:   Right femoral artery access with 4 Hungarian sheath  Aortogram with bilateral lower extremity runoff  Atherectomy and DCB to the LEFT SFA occlusion       PROCEDURE COMMENTS:  Felicia Contreras was brought to the cath lab and placed on the cardiac   catherization table in the postabsortive state. Time out was taken and   patient received moderate sedation. The patient was prepped and draped   according to the cath lab protocol under strict aseptic and sterile   condition. Patient's right femoral artery sight was prepped  and draped.   Under fluoroscopic guidance theright femoral artery was punctured using a   21 gauge needle utilizing the modified Seldinger technique. 4 Cook Islander   sheath was introduced over a wire. After aspirating for blood it was   flushed with heparinized saline.    Right extremity imaging was performed via the sheath. A 4F RIM catheter   was then advanced to the abdominal aorta at the level of L1 and aortogram   was performed. Catheter was then pulled back at the bifurcation at L5 and   engaged in the contralateral BRENDAN. Left extremity imaging was then   performed. After the procedure was completed the sheath was removed in the   cath lab and hemostasis achieved via manual compression. No complications   occurred. Patient tolerated the procedure well.     Findings:  Aorta:  Abdominal aorta shows only mild disease.  Bilateral renal arteries are patent     Right Lower Extremity:   Common  Iliac artery was normal  SFA shows 90% focal distal segment stenosis  Popliteal Artery was patent with no disease  Tibio-peroneal trunk patent with no disease  Anterior tibial artery was patent  Posterior tibial artery was patent  3 Vessel run off present    Left Lower Extremity:  Common iliac artery shows mild to moderate disease and calcific changes  SFA shows 100% occlusion at the adductor canal with reconstitution at ATK   popliteal  Tibio-peroneal trunk patent with moderate disease  Anterior tibial artery was patent  Posterior tibial artery is patent with no disease  3 Vessel run off present    INTERVENTION DETAIL:  Pt did have small caliber vessels and diffuse disease in both iliac   arteries. A 6F x 45 sheath placed to the contralateral iliac artery. We   used a micro-crosser catheter with 0.014 Choice PT ES and cross the lesion   in the left SFA.  A 6F Hawkone atherectomy was used and then DCB with 4x150 balloon used.   Excellent results were noted and the long sheath was exchanged with short   6F sheath.    Successful  intervention to the LEFT SFA using HawkOne Atherectomy and   In-pact Admiral medtronic drug coated balloon from 100% to 20% residual.    COMPLICATION  Angiogram showed extravasation of contrast at the arteriotomy site on the   right common femoral. We used a long balloon inflation using 5x40   Evercross balloon for 5 minute and noticed no further extravasation.   Protamine was given as well and ACT was 108. At this time we decided to   use a ProGlide perclose but this failed as well. We then pulled the sheath   and pressure was held for extended period of time. At the completion there   was a small hematoma and stable hemodynamics.     EBL: 100 ML  No specimens were removed.    Plan  Patient will be kept overnight in PCU  Femostop to be applied  Ultrasound of right groin in the morning.  Monitor H/H    Brian Ely MD       Procedures       A/P:  Severe PAD with ischemic ulcer on the right foot c/w CLI  S/p left SFA atherectomy with good response and healing of ulcers  Essential Hypertension    Plan  Proceed with atherectomy and angioplasty of the right SFA occlusion  Pain medicines to be sent for leg pain    Patient's Body mass index is 24.88 kg/m². BMI is within normal parameters. No follow-up required..         ICD-10-CM ICD-9-CM   1. Bilateral carotid artery stenosis I65.23 433.10     433.30   2. PAD (peripheral artery disease) (CMS/[facility]) I73.9 443.9   3. Essential hypertension I10 401.9   4. Hypercholesteremia E78.00 272.0        Thank you for allowing me to participate in the care of Felicia Contreras. Feel free to contact me directly with any further questions or concerns.

## 2019-03-13 NOTE — PROGRESS NOTES
[facility] CARDIOLOGY  [address] [place] [id number]  Phone: [phone]  Fax: [phone]    03/13/2019    Chief Complaint   Patient presents with   • Peripheral Vascular Disease        History:   Felicia Contreras is a 58 y.o. female seen in follow-up for a left arteriogram that was performed on 12/03/18. Patient states that she is doing much better since the procedure and states that the sores on her feet are healing up. She admits that she is still currently smoking but she is trying to quit. She also states that she does not have much pain with her leg anymore but does complain with her right leg bothering her. She also states that she does a sore on her right foot now and would like to discuss her options with pain medication.  She had left leg treated and now her right leg has an ulcer and with pain.    Past Medical History:   Diagnosis Date   • History of hysterectomy    • Hyperlipidemia    • Hypertension    • Status post carotid surgery 06/11/2018       Past Surgical History:   Procedure Laterality Date   • CARDIAC CATHETERIZATION N/A 12/3/2018    Procedure: Atherectomy-peripheral;  Surgeon: Brian Ely MD;  Location: [facility] CATH INVASIVE LOCATION;  Service: Interventional Radiology   • CARDIOVASCULAR STRESS TEST  04/30/2018    L. Cardiolite- Negative   • ECHO - CONVERTED  04/30/2018    TLS. EF 65%. RVSP- 18 mmHg   • INTERVENTIONAL RADIOLOGY PROCEDURE Bilateral 12/3/2018    Procedure: ANGIOGRAM EXTREMITY BILATERAL;  Surgeon: Brian Ely MD;  Location:  [facility] CATH INVASIVE LOCATION;  Service: Interventional Radiology   • INTERVENTIONAL RADIOLOGY PROCEDURE N/A 12/3/2018    Procedure: IR PTA each additional peripheral artery;  Surgeon: Brian Ely MD;  Location:  [facility] CATH INVASIVE LOCATION;  Service: Interventional Radiology   • OTHER SURGICAL HISTORY  04/12/2018    CTA- 75% (L) ICA. 100% (R) ICA. 100% (R) Verteberal artery.. Small aneurysm of Basilar Artery         Past Social History:  Social History     Socioeconomic History   • Marital status:      Spouse name: Not on file   • Number of children: Not on file   • Years of education: Not on file   • Highest education level: Not on file   Tobacco Use   • Smoking status: Current Every Day Smoker     Packs/day: 0.50     Years: 40.00     Pack years: 20.00     Types: Cigarettes     Start date: 4/19/1978   • Smokeless tobacco: Never Used   Substance and Sexual Activity   • Alcohol use: No     Comment: rarely drinks a glass of wine    • Drug use: Yes     Types: Marijuana     Comment: has used marijuana in the past.   • Sexual activity: Defer       Past Family History:  Family History   Problem Relation Age of Onset   • Hypertension Mother    • No Known Problems Sister    • Stroke Maternal Grandmother    • No Known Problems Sister        Review of Systems:   Please see HPI  Constitution: No chills, no rigors, no unexplained weight loss or weight gain  Eyes:  No diplopia, no blurred vision, no loss of vision, conjunctiva is pink and sclera is anicteric  ENT:  No tinnitus, no otorrhea, no epistaxis, no sore throat   Respiratory: No cough, no hemoptysis  Cardiovascular: see HPI  Gastrointestinal: No nausea, no vomiting, no hematemesis, no diarrhea or constipation, no melena  Genitourinary: No frequency of dysuria no hematuria  Integument: No pruritis and  no skin rash  Hematologic / Lymphatic: No excessive bleeding, easy bruising, fatigue, lymphadenopathy and petechiae  Musculoskeletal: No joint pain, joint stiffness, joint swelling, muscle pain, muscle weakness and neck pain  Neurological: No dizziness, headaches, light headedness, seizures and vertigo  Endocrine: No frequent urination and nocturia, temperature intolerance, weight gain, unintended and weight loss, unintended      Current Outpatient Medications   Medication Sig Dispense Refill   • albuterol (PROVENTIL HFA;VENTOLIN HFA) 108 (90 Base) MCG/ACT inhaler Inhale 2  puffs Every 4 (Four) Hours As Needed for Wheezing.     • ALPRAZolam (XANAX) 0.5 MG tablet Take 0.5 mg by mouth Daily As Needed.  0   • aspirin 81 MG EC tablet Take 81 mg by mouth Daily.     • atorvastatin (LIPITOR) 40 MG tablet Take 40 mg by mouth Every Night.     • atropine 1 % ophthalmic solution INSTILL 1 DROP INTO THE LEFT EYE TWICE DAILY  1   • Cholecalciferol (VITAMIN D3) 5000 units capsule capsule Take 5,000 Units by mouth 1 (One) Time Per Week.     • Cholecalciferol (VITAMIN D3) 18441 units capsule TAKE 1 CAPSULE ONCE A WEEK  5   • clopidogrel (PLAVIX) 75 MG tablet Take 1 tablet by mouth Daily. 30 tablet 11   • latanoprost (XALATAN) 0.005 % ophthalmic solution Administer 1 drop to both eyes Every Night.     • lisinopril-hydrochlorothiazide (PRINZIDE,ZESTORETIC) 10-12.5 MG per tablet TAKE 1 TABLET DAILY 30 tablet 2   • methazolAMIDE (NEPTAZANE) 25 MG tablet Take 50 mg by mouth 2 (Two) Times a Day.  12   • mirtazapine (REMERON) 30 MG tablet TAKE 1 TABLET ONCE DAILY AT BEDTIME  5   • nicotine (NICODERM CQ) 21 MG/24HR patch Place 1 patch on the skin as directed by provider Daily. 30 patch 3   • NIFEdipine XL (PROCARDIA XL) 30 MG 24 hr tablet TAKE 1 TABLET DAILY 30 tablet 2   • Omega-3 Fatty Acids (FISH OIL) 1000 MG capsule capsule Take 1 capsule by mouth 2 (Two) Times a Day.  5   • propranolol (INDERAL) 20 MG tablet Take 20 mg by mouth 3 (Three) Times a Day.     • raNITIdine (ZANTAC) 150 MG tablet Take 150 mg by mouth 2 (Two) Times a Day.  2   • SIMBRINZA 1-0.2 % suspension INSTILL 1 DROP IN EACH EYE 3 TIMES DAILY  5   • timolol (TIMOPTIC) 0.5 % ophthalmic solution Administer 1 drop to both eyes 2 (Two) Times a Day.     • tiotropium bromide monohydrate (SPIRIVA RESPIMAT) 2.5 MCG/ACT aerosol solution inhaler Inhale 2 puffs Daily.     • traMADol (ULTRAM) 50 MG tablet Take 50 mg by mouth 3 (Three) Times a Day.       No current facility-administered medications for this visit.         No Known  Allergies    Objective:  Vitals:    03/13/19 1454   BP: 158/73   Pulse: 65   SpO2: 100%     Comfortable NAD  PERRL, conjunctiva clear  Neck supple, no JVD or thyromegaly appreciated  S1/S2 RRR, no m/r/g  Lungs CTA B, normal effort  Abdomen S/NT/ND (+) BS, no HSM appreciated  Extremities warm, no clubbing, cyanosis, or edema  Normal gait  No visible or palpable skin lesions  A/Ox4, mood and affect appropriate  Pulse exam:    Feet are cold on right and warm on left  Capillary refill is normal  No evidence of ulceration or color change of the toes  PULSES  Right DP and PT are 1+ and Left DP and PT are 0+  Ulcers on left foot are healed  Right lateral foot has a non-healing ulcer    DATA:      Results for orders placed during the hospital encounter of 04/30/18   Adult Transthoracic Echo Complete W/ Cont if Necessary Per Protocol      Results for orders placed during the hospital encounter of 04/30/18   Stress Test With Myocardial Perfusion One Day      Results for orders placed during the hospital encounter of 04/30/18   Stress Test With Myocardial Perfusion One Day      Results for orders placed during the hospital encounter of 12/03/18   Cardiac Catheterization/Vascular Study    Narrative PERIPHERAL ARTERIOGRAM / INTERVENTION REPORT     DATE OF PROCEDURE: 12/03/18     INDICATION FOR PROCEDURE: Severe claudication of the left leg.    :  MD Bart Alexander MD       PROCEDURE PERFORMED:   Right femoral artery access with 4 Lao sheath  Aortogram with bilateral lower extremity runoff  Atherectomy and DCB to the LEFT SFA occlusion       PROCEDURE COMMENTS:  Felicia Contreras was brought to the cath lab and placed on the cardiac   catherization table in the postabsortive state. Time out was taken and   patient received moderate sedation. The patient was prepped and draped   according to the cath lab protocol under strict aseptic and sterile   condition. Patient's right femoral artery sight was prepped  and draped.   Under fluoroscopic guidance theright femoral artery was punctured using a   21 gauge needle utilizing the modified Seldinger technique. 4 Italian   sheath was introduced over a wire. After aspirating for blood it was   flushed with heparinized saline.    Right extremity imaging was performed via the sheath. A 4F RIM catheter   was then advanced to the abdominal aorta at the level of L1 and aortogram   was performed. Catheter was then pulled back at the bifurcation at L5 and   engaged in the contralateral BRENDAN. Left extremity imaging was then   performed. After the procedure was completed the sheath was removed in the   cath lab and hemostasis achieved via manual compression. No complications   occurred. Patient tolerated the procedure well.     Findings:  Aorta:  Abdominal aorta shows only mild disease.  Bilateral renal arteries are patent     Right Lower Extremity:   Common  Iliac artery was normal  SFA shows 90% focal distal segment stenosis  Popliteal Artery was patent with no disease  Tibio-peroneal trunk patent with no disease  Anterior tibial artery was patent  Posterior tibial artery was patent  3 Vessel run off present    Left Lower Extremity:  Common iliac artery shows mild to moderate disease and calcific changes  SFA shows 100% occlusion at the adductor canal with reconstitution at ATK   popliteal  Tibio-peroneal trunk patent with moderate disease  Anterior tibial artery was patent  Posterior tibial artery is patent with no disease  3 Vessel run off present    INTERVENTION DETAIL:  Pt did have small caliber vessels and diffuse disease in both iliac   arteries. A 6F x 45 sheath placed to the contralateral iliac artery. We   used a micro-crosser catheter with 0.014 Choice PT ES and cross the lesion   in the left SFA.  A 6F Hawkone atherectomy was used and then DCB with 4x150 balloon used.   Excellent results were noted and the long sheath was exchanged with short   6F sheath.    Successful  intervention to the LEFT SFA using HawkOne Atherectomy and   In-pact Admiral medtronic drug coated balloon from 100% to 20% residual.    COMPLICATION  Angiogram showed extravasation of contrast at the arteriotomy site on the   right common femoral. We used a long balloon inflation using 5x40   Evercross balloon for 5 minute and noticed no further extravasation.   Protamine was given as well and ACT was 108. At this time we decided to   use a ProGlide perclose but this failed as well. We then pulled the sheath   and pressure was held for extended period of time. At the completion there   was a small hematoma and stable hemodynamics.     EBL: 100 ML  No specimens were removed.    Plan  Patient will be kept overnight in PCU  Femostop to be applied  Ultrasound of right groin in the morning.  Monitor H/H    Brian Ely MD       Procedures       A/P:  Severe PAD with ischemic ulcer on the right foot c/w CLI  S/p left SFA atherectomy with good response and healing of ulcers  Essential Hypertension    Plan  Proceed with atherectomy and angioplasty of the right SFA occlusion  Pain medicines to be sent for leg pain    Patient's Body mass index is 24.88 kg/m². BMI is within normal parameters. No follow-up required..         ICD-10-CM ICD-9-CM   1. Bilateral carotid artery stenosis I65.23 433.10     433.30   2. PAD (peripheral artery disease) (CMS/[facility]) I73.9 443.9   3. Essential hypertension I10 401.9   4. Hypercholesteremia E78.00 272.0        Thank you for allowing me to participate in the care of Felicia Contreras. Feel free to contact me directly with any further questions or concerns.

## 2019-03-20 ENCOUNTER — HOSPITAL ENCOUNTER (OUTPATIENT)
Facility: HOSPITAL | Age: 59
Discharge: HOME | End: 2019-03-20
Attending: INTERNAL MEDICINE | Admitting: INTERNAL MEDICINE

## 2019-03-20 VITALS
HEART RATE: 60 BPM | OXYGEN SATURATION: 98 % | RESPIRATION RATE: 16 BRPM | SYSTOLIC BLOOD PRESSURE: 148 MMHG | DIASTOLIC BLOOD PRESSURE: 73 MMHG

## 2019-03-20 DIAGNOSIS — I73.9 PAD (PERIPHERAL ARTERY DISEASE) (HCC): ICD-10-CM

## 2019-03-20 DIAGNOSIS — R07.89 OTHER CHEST PAIN: Primary | ICD-10-CM

## 2019-03-20 DIAGNOSIS — I10 ESSENTIAL HYPERTENSION: ICD-10-CM

## 2019-03-20 LAB
ANION GAP SERPL CALCULATED.3IONS-SCNC: 10.7 MMOL/L
BASOPHILS # BLD AUTO: 0.03 10*3/MM3 (ref 0–0.2)
BASOPHILS NFR BLD AUTO: 0.3 % (ref 0–1.5)
BUN BLD-MCNC: 26 MG/DL (ref 8–23)
BUN/CREAT SERPL: 25 (ref 7–25)
CALCIUM SPEC-SCNC: 9.7 MG/DL (ref 8.6–10.5)
CHLORIDE SERPL-SCNC: 107 MMOL/L (ref 98–107)
CO2 SERPL-SCNC: 22.3 MMOL/L (ref 22–29)
CREAT BLD-MCNC: 1.04 MG/DL (ref 0.57–1)
DEPRECATED RDW RBC AUTO: 52.9 FL (ref 37–54)
EOSINOPHIL # BLD AUTO: 0.51 10*3/MM3 (ref 0–0.4)
EOSINOPHIL NFR BLD AUTO: 4.6 % (ref 0.3–6.2)
ERYTHROCYTE [DISTWIDTH] IN BLOOD BY AUTOMATED COUNT: 16 % (ref 12.3–15.4)
GFR SERPL CREATININE-BSD FRML MDRD: 54 ML/MIN/1.73
GLUCOSE BLD-MCNC: 120 MG/DL (ref 65–99)
HCT VFR BLD AUTO: 37.7 % (ref 34–46.6)
HGB BLD-MCNC: 11.3 G/DL (ref 12–15.9)
IMM GRANULOCYTES # BLD AUTO: 0.04 10*3/MM3 (ref 0–0.05)
IMM GRANULOCYTES NFR BLD AUTO: 0.4 % (ref 0–0.5)
LYMPHOCYTES # BLD AUTO: 1.45 10*3/MM3 (ref 0.7–3.1)
LYMPHOCYTES NFR BLD AUTO: 13.1 % (ref 19.6–45.3)
MCH RBC QN AUTO: 27.2 PG (ref 26.6–33)
MCHC RBC AUTO-ENTMCNC: 30 G/DL (ref 31.5–35.7)
MCV RBC AUTO: 90.6 FL (ref 79–97)
MONOCYTES # BLD AUTO: 0.95 10*3/MM3 (ref 0.1–0.9)
MONOCYTES NFR BLD AUTO: 8.6 % (ref 5–12)
NEUTROPHILS # BLD AUTO: 8.07 10*3/MM3 (ref 1.4–7)
NEUTROPHILS NFR BLD AUTO: 73 % (ref 42.7–76)
PLATELET # BLD AUTO: 336 10*3/MM3 (ref 140–450)
PMV BLD AUTO: 10.3 FL (ref 6–12)
POTASSIUM BLD-SCNC: 3.7 MMOL/L (ref 3.5–5.2)
RBC # BLD AUTO: 4.16 10*6/MM3 (ref 3.77–5.28)
SODIUM BLD-SCNC: 140 MMOL/L (ref 136–145)
WBC NRBC COR # BLD: 11.05 10*3/MM3 (ref 3.4–10.8)

## 2019-03-20 PROCEDURE — C1751 CATH, INF, PER/CENT/MIDLINE: HCPCS | Performed by: INTERNAL MEDICINE

## 2019-03-20 PROCEDURE — 36415 COLL VENOUS BLD VENIPUNCTURE: CPT | Performed by: INTERNAL MEDICINE

## 2019-03-20 PROCEDURE — C1894 INTRO/SHEATH, NON-LASER: HCPCS | Performed by: INTERNAL MEDICINE

## 2019-03-20 PROCEDURE — 0 IOPAMIDOL PER 1 ML: Performed by: INTERNAL MEDICINE

## 2019-03-20 PROCEDURE — 25010000002 HEPARIN (PORCINE) PER 1000 UNITS: Performed by: INTERNAL MEDICINE

## 2019-03-20 PROCEDURE — 36245 INS CATH ABD/L-EXT ART 1ST: CPT | Performed by: INTERNAL MEDICINE

## 2019-03-20 PROCEDURE — C1769 GUIDE WIRE: HCPCS | Performed by: INTERNAL MEDICINE

## 2019-03-20 PROCEDURE — 75716 ARTERY X-RAYS ARMS/LEGS: CPT | Performed by: INTERNAL MEDICINE

## 2019-03-20 PROCEDURE — 85025 COMPLETE CBC W/AUTO DIFF WBC: CPT | Performed by: INTERNAL MEDICINE

## 2019-03-20 PROCEDURE — 75625 CONTRAST EXAM ABDOMINL AORTA: CPT | Performed by: INTERNAL MEDICINE

## 2019-03-20 PROCEDURE — 25010000002 MIDAZOLAM PER 1 MG: Performed by: INTERNAL MEDICINE

## 2019-03-20 PROCEDURE — 25010000002 FENTANYL CITRATE (PF) 100 MCG/2ML SOLUTION: Performed by: INTERNAL MEDICINE

## 2019-03-20 PROCEDURE — 99152 MOD SED SAME PHYS/QHP 5/>YRS: CPT | Performed by: INTERNAL MEDICINE

## 2019-03-20 PROCEDURE — 80048 BASIC METABOLIC PNL TOTAL CA: CPT | Performed by: INTERNAL MEDICINE

## 2019-03-20 PROCEDURE — 99153 MOD SED SAME PHYS/QHP EA: CPT | Performed by: INTERNAL MEDICINE

## 2019-03-20 RX ORDER — ATORVASTATIN CALCIUM 40 MG/1
40 TABLET, FILM COATED ORAL NIGHTLY
Status: CANCELLED | OUTPATIENT
Start: 2019-03-20

## 2019-03-20 RX ORDER — PROPRANOLOL HYDROCHLORIDE 20 MG/1
20 TABLET ORAL 3 TIMES DAILY
Status: CANCELLED | OUTPATIENT
Start: 2019-03-20

## 2019-03-20 RX ORDER — LIDOCAINE HYDROCHLORIDE 20 MG/ML
INJECTION, SOLUTION INFILTRATION; PERINEURAL AS NEEDED
Status: DISCONTINUED | OUTPATIENT
Start: 2019-03-20 | End: 2019-03-20 | Stop reason: HOSPADM

## 2019-03-20 RX ORDER — MIDAZOLAM HYDROCHLORIDE 1 MG/ML
INJECTION INTRAMUSCULAR; INTRAVENOUS AS NEEDED
Status: DISCONTINUED | OUTPATIENT
Start: 2019-03-20 | End: 2019-03-20 | Stop reason: HOSPADM

## 2019-03-20 RX ORDER — SODIUM CHLORIDE 9 MG/ML
INJECTION, SOLUTION INTRAVENOUS CONTINUOUS PRN
Status: COMPLETED | OUTPATIENT
Start: 2019-03-20 | End: 2019-03-20

## 2019-03-20 RX ORDER — ASPIRIN 81 MG/1
81 TABLET ORAL DAILY
Status: CANCELLED | OUTPATIENT
Start: 2019-03-20

## 2019-03-20 RX ORDER — ONDANSETRON 4 MG/1
4 TABLET, ORALLY DISINTEGRATING ORAL EVERY 6 HOURS PRN
Status: DISCONTINUED | OUTPATIENT
Start: 2019-03-20 | End: 2019-03-20 | Stop reason: HOSPADM

## 2019-03-20 RX ORDER — TRAMADOL HYDROCHLORIDE 50 MG/1
50 TABLET ORAL 3 TIMES DAILY
Status: CANCELLED | OUTPATIENT
Start: 2019-03-20

## 2019-03-20 RX ORDER — SODIUM CHLORIDE 9 MG/ML
100 INJECTION, SOLUTION INTRAVENOUS ONCE
Status: DISCONTINUED | OUTPATIENT
Start: 2019-03-20 | End: 2019-03-20 | Stop reason: HOSPADM

## 2019-03-20 RX ORDER — TIMOLOL MALEATE 5 MG/ML
1 SOLUTION/ DROPS OPHTHALMIC 2 TIMES DAILY
Status: CANCELLED | OUTPATIENT
Start: 2019-03-20

## 2019-03-20 RX ORDER — OXYCODONE AND ACETAMINOPHEN 7.5; 325 MG/1; MG/1
1 TABLET ORAL EVERY 6 HOURS PRN
Status: CANCELLED | OUTPATIENT
Start: 2019-03-20

## 2019-03-20 RX ORDER — CLOPIDOGREL BISULFATE 75 MG/1
75 TABLET ORAL DAILY
Status: CANCELLED | OUTPATIENT
Start: 2019-03-20

## 2019-03-20 RX ORDER — ONDANSETRON 2 MG/ML
4 INJECTION INTRAMUSCULAR; INTRAVENOUS EVERY 6 HOURS PRN
Status: DISCONTINUED | OUTPATIENT
Start: 2019-03-20 | End: 2019-03-20 | Stop reason: HOSPADM

## 2019-03-20 RX ORDER — HEPARIN SODIUM 1000 [USP'U]/ML
INJECTION, SOLUTION INTRAVENOUS; SUBCUTANEOUS AS NEEDED
Status: DISCONTINUED | OUTPATIENT
Start: 2019-03-20 | End: 2019-03-20 | Stop reason: HOSPADM

## 2019-03-20 RX ORDER — FENTANYL CITRATE 50 UG/ML
INJECTION, SOLUTION INTRAMUSCULAR; INTRAVENOUS AS NEEDED
Status: DISCONTINUED | OUTPATIENT
Start: 2019-03-20 | End: 2019-03-20 | Stop reason: HOSPADM

## 2019-03-20 RX ORDER — ONDANSETRON 4 MG/1
4 TABLET, FILM COATED ORAL EVERY 6 HOURS PRN
Status: DISCONTINUED | OUTPATIENT
Start: 2019-03-20 | End: 2019-03-20 | Stop reason: HOSPADM

## 2019-03-20 RX ORDER — NICOTINE 21 MG/24HR
1 PATCH, TRANSDERMAL 24 HOURS TRANSDERMAL EVERY 24 HOURS
Status: CANCELLED | OUTPATIENT
Start: 2019-03-20

## 2019-03-20 NOTE — DISCHARGE INSTR - ACTIVITY
No Baths, hot tubs, or swimming pools for 3 days.    Rest when you get home, no activities today or tomorrow.    If site starts to bleed or you notice swelling or a lot of bruising at site, hold pressure and call 911 or have someone drive you to nearest Emergency Room.    Tomorrow you can take a shower and remove dressing. Replace with a bandaid, with no creams, powders, or ointments applied.

## 2019-04-03 ENCOUNTER — TELEPHONE (OUTPATIENT)
Dept: CARDIOLOGY | Facility: CLINIC | Age: 59
End: 2019-04-03

## 2019-04-03 NOTE — TELEPHONE ENCOUNTER
Spoke to patient's , he states that Dr. Ely did a procedure on her a week or two ago but it was unsuccessful. Dr. Ely to Felicia that he was going to refer her to a doctor at  however if they had not heard anything to call us.    Patient's , Martin, states that he has not heard from  or from our office. I told him that I would follow up with Dr. Ely's nurse and give her the call back number [phone]. I have notified Madelyn of this.

## 2019-04-15 ENCOUNTER — TELEPHONE (OUTPATIENT)
Dept: CARDIOLOGY | Facility: CLINIC | Age: 59
End: 2019-04-15

## 2019-04-15 DIAGNOSIS — I73.9 PAD (PERIPHERAL ARTERY DISEASE) (HCC): Primary | ICD-10-CM

## 2019-04-15 NOTE — TELEPHONE ENCOUNTER
PATIENT CALLED FOR UPDATE RE: REFERRAL TO [facility].    KARENA DISCUSSED WITH DR ROE AND HE SAID TO REFER TO DR PALACIOS.    PATIENT ALSO ASKED ABOUT PAIN MEDICATIONS.

## 2019-04-16 ENCOUNTER — TELEPHONE (OUTPATIENT)
Dept: CARDIOLOGY | Facility: CLINIC | Age: 59
End: 2019-04-16

## 2019-04-16 NOTE — TELEPHONE ENCOUNTER
Called patient to let her that Dr. Ely was unable to send in her pain medication due to him not having access to his [employer] account yet. I told her we should be able to fix that tomorrow and send it in then. If not I told her I would call and give her an update.

## 2019-04-16 NOTE — TELEPHONE ENCOUNTER
I called patient and informed her that the referral to Dr. Palmer was put in and she should hear from them this week on her appointment. I also let her know that we would send in some pain medication this evening because Dr. Ely would not be back in the office until this afternoon because he was down in the cath lab earlier this morning. Patient was understanding.

## 2019-04-17 ENCOUNTER — TELEPHONE (OUTPATIENT)
Dept: CARDIOLOGY | Facility: CLINIC | Age: 59
End: 2019-04-17

## 2019-04-18 RX ORDER — OXYCODONE AND ACETAMINOPHEN 7.5; 325 MG/1; MG/1
1 TABLET ORAL EVERY 6 HOURS PRN
Qty: 40 TABLET | Refills: 0 | Status: SHIPPED | OUTPATIENT
Start: 2019-04-18 | End: 2019-05-03

## 2019-04-19 ENCOUNTER — TELEPHONE (OUTPATIENT)
Dept: CARDIOLOGY | Facility: CLINIC | Age: 59
End: 2019-04-19

## 2019-04-19 NOTE — TELEPHONE ENCOUNTER
I called the patient and let her know that her medication was sent in yesterday evening. Verbalized understanding.

## 2019-04-29 DIAGNOSIS — I10 ESSENTIAL HYPERTENSION: ICD-10-CM

## 2019-04-29 RX ORDER — NIFEDIPINE 30 MG/1
TABLET, EXTENDED RELEASE ORAL
Qty: 30 TABLET | Refills: 2 | OUTPATIENT
Start: 2019-04-29

## 2019-05-15 ENCOUNTER — OFFICE VISIT (OUTPATIENT)
Dept: CARDIAC SURGERY | Facility: CLINIC | Age: 59
End: 2019-05-15

## 2019-05-15 VITALS
HEIGHT: 60 IN | SYSTOLIC BLOOD PRESSURE: 121 MMHG | WEIGHT: 119.6 LBS | BODY MASS INDEX: 23.48 KG/M2 | OXYGEN SATURATION: 100 % | DIASTOLIC BLOOD PRESSURE: 69 MMHG | HEART RATE: 80 BPM

## 2019-05-15 DIAGNOSIS — I73.9 PAD (PERIPHERAL ARTERY DISEASE) (HCC): Primary | ICD-10-CM

## 2019-05-15 DIAGNOSIS — L97.511 ISCHEMIC ULCER OF RIGHT FOOT, LIMITED TO BREAKDOWN OF SKIN (HCC): ICD-10-CM

## 2019-05-15 PROBLEM — L97.509 ISCHEMIC ULCER OF FOOT (HCC): Status: ACTIVE | Noted: 2019-05-15

## 2019-05-15 PROCEDURE — 99406 BEHAV CHNG SMOKING 3-10 MIN: CPT | Performed by: THORACIC SURGERY (CARDIOTHORACIC VASCULAR SURGERY)

## 2019-05-15 PROCEDURE — 99204 OFFICE O/P NEW MOD 45 MIN: CPT | Performed by: THORACIC SURGERY (CARDIOTHORACIC VASCULAR SURGERY)

## 2019-05-15 NOTE — PROGRESS NOTES
05/15/2019  Patient Information  Felicia Burdick [address] [facility] [address] [place] [address]   1960  'PCP/Referring Physician'  Jules Fried MD  [phone]  Brian Ely MD  [phone]  Chief Complaint   Patient presents with   • Claudication     Referred by Dr. Ely for PAD       History of Present Illness: 58-year-old  female with a history of hypertension, hyperlipidemia, cervical cancer, TIA, COPD and active tobacco abuse who presents with lower extremity pain.  She initially had bilateral lower extremity pain and her left leg pain has improved after angioplasty with Dr. Ely of cardiology.  Her left foot ulcer has also healed.  She has a right foot ulcer that has worsened over time.  She has right lower extremity pain that is from the thigh down.  She describes this pain as a constant, sharp, crampy pain that is worse with ambulation.  This pain limits her ability to sleep.        Patient Active Problem List   Diagnosis   • Bilateral carotid artery disease (CMS/HCC)   • Essential hypertension   • Tobacco abuse   • PAD (peripheral artery disease) (CMS/HCC)   • COPD mixed type (CMS/HCC)   • Other chest pain   • Shortness of breath   • Hypercholesteremia   • Femoral artery hematoma complicating cardiac catheterization   • H/O carotid endarterectomy     Past Medical History:   Diagnosis Date   • Anxiety    • Cervical cancer (CMS/HCC)    • COPD (chronic obstructive pulmonary disease) (CMS/HCC)    • History of blood transfusion    • History of hysterectomy    • Hyperlipidemia    • Hypertension    • PVD (peripheral vascular disease) (CMS/HCC)    • Status post carotid surgery 06/11/2018   • TIA (transient ischemic attack) 01/2018     Past Surgical History:   Procedure Laterality Date   • ARTERIOGRAM N/A 3/20/2019    Procedure: Arteriogram-right ;  Surgeon: Brian Ely MD;  Location: [facility] INVASIVE LOCATION;   Service: Cardiology   • CARDIAC CATHETERIZATION N/A 12/3/2018    Procedure: Atherectomy-peripheral;  Surgeon: Brian Ely MD;  Location:  [facility] CATH INVASIVE LOCATION;  Service: Interventional Radiology   • CARDIOVASCULAR STRESS TEST  04/30/2018    L. Cardiolite- Negative   • CAROTID ENDARTERECTOMY Left    • CATARACT EXTRACTION Bilateral    • ECHO - CONVERTED  04/30/2018    TLS. EF 65%. RVSP- 18 mmHg   • HYSTERECTOMY     • INTERVENTIONAL RADIOLOGY PROCEDURE Bilateral 12/3/2018    Procedure: ANGIOGRAM EXTREMITY BILATERAL;  Surgeon: Brian Ely MD;  Location:  [facility] CATH INVASIVE LOCATION;  Service: Interventional Radiology   • INTERVENTIONAL RADIOLOGY PROCEDURE N/A 12/3/2018    Procedure: IR PTA each additional peripheral artery;  Surgeon: Brian Ely MD;  Location:  [facility] CATH INVASIVE LOCATION;  Service: Interventional Radiology   • OTHER SURGICAL HISTORY  04/12/2018    CTA- 75% (L) ICA. 100% (R) ICA. 100% (R) Verteberal artery.. Small aneurysm of Basilar Artery   • REFRACTIVE SURGERY Left        Current Outpatient Medications:   •  albuterol (PROVENTIL HFA;VENTOLIN HFA) 108 (90 Base) MCG/ACT inhaler, Inhale 2 puffs Every 4 (Four) Hours As Needed for Wheezing., Disp: , Rfl:   •  ALPRAZolam (XANAX) 0.5 MG tablet, Take 0.5 mg by mouth Daily As Needed., Disp: , Rfl: 0  •  aspirin 81 MG EC tablet, Take 81 mg by mouth Daily., Disp: , Rfl:   •  atorvastatin (LIPITOR) 40 MG tablet, Take 40 mg by mouth Every Night., Disp: , Rfl:   •  atropine 1 % ophthalmic solution, INSTILL 1 DROP INTO THE LEFT EYE TWICE DAILY, Disp: , Rfl: 1  •  Cholecalciferol (VITAMIN D3) 5000 units capsule capsule, Take 5,000 Units by mouth 1 (One) Time Per Week., Disp: , Rfl:   •  Cholecalciferol (VITAMIN D3) 56233 units capsule, TAKE 1 CAPSULE ONCE A WEEK, Disp: , Rfl: 5  •  clopidogrel (PLAVIX) 75 MG tablet, Take 1 tablet by mouth Daily., Disp: 30 tablet, Rfl: 11  •  latanoprost (XALATAN) 0.005 % ophthalmic solution, Administer 1 drop  to both eyes Every Night., Disp: , Rfl:   •  lisinopril-hydrochlorothiazide (PRINZIDE,ZESTORETIC) 10-12.5 MG per tablet, TAKE 1 TABLET DAILY, Disp: 30 tablet, Rfl: 2  •  methazolAMIDE (NEPTAZANE) 25 MG tablet, Take 50 mg by mouth 2 (Two) Times a Day., Disp: , Rfl: 12  •  mirtazapine (REMERON) 30 MG tablet, TAKE 1 TABLET ONCE DAILY AT BEDTIME, Disp: , Rfl: 5  •  nicotine (NICODERM CQ) 21 MG/24HR patch, Place 1 patch on the skin as directed by provider Daily., Disp: 30 patch, Rfl: 3  •  NIFEdipine XL (PROCARDIA XL) 30 MG 24 hr tablet, TAKE 1 TABLET DAILY, Disp: 30 tablet, Rfl: 2  •  Omega-3 Fatty Acids (FISH OIL) 1000 MG capsule capsule, Take 1 capsule by mouth 2 (Two) Times a Day., Disp: , Rfl: 5  •  propranolol (INDERAL) 20 MG tablet, Take 20 mg by mouth 3 (Three) Times a Day., Disp: , Rfl:   •  raNITIdine (ZANTAC) 150 MG tablet, Take 150 mg by mouth 2 (Two) Times a Day., Disp: , Rfl: 2  •  SIMBRINZA 1-0.2 % suspension, INSTILL 1 DROP IN EACH EYE 3 TIMES DAILY, Disp: , Rfl: 5  •  timolol (TIMOPTIC) 0.5 % ophthalmic solution, Administer 1 drop to both eyes 2 (Two) Times a Day., Disp: , Rfl:   •  tiotropium bromide monohydrate (SPIRIVA RESPIMAT) 2.5 MCG/ACT aerosol solution inhaler, Inhale 2 puffs Daily., Disp: , Rfl:   •  traMADol (ULTRAM) 50 MG tablet, Take 50 mg by mouth 3 (Three) Times a Day., Disp: , Rfl:   No Known Allergies  Social History     Socioeconomic History   • Marital status:      Spouse name: Not on file   • Number of children: 5   • Years of education: Not on file   • Highest education level: Not on file   Occupational History   • Occupation: [employer]     Employer: DISABLED   Tobacco Use   • Smoking status: Current Every Day Smoker     Packs/day: 0.50     Years: 40.00     Pack years: 20.00     Types: Cigarettes     Start date: 4/19/1978   • Smokeless tobacco: Never Used   • Tobacco comment: previously smoked up to 3 packs per day   Substance and Sexual Activity   • Alcohol use: No     Comment:  "rarely drinks a glass of wine    • Drug use: Yes     Types: Marijuana     Comment: has used marijuana in the past.   • Sexual activity: Defer   Social History Narrative    Lives in [place], [place] with spouse     Family History   Problem Relation Age of Onset   • Hypertension Mother    • Cancer Mother    • Glaucoma Mother    • Colon cancer Father    • No Known Problems Sister    • Stroke Maternal Grandmother    • No Known Problems Sister      Review of Systems   Constitution: Positive for decreased appetite. Negative for chills, fever, malaise/fatigue, night sweats and weight loss.   HENT: Negative for hearing loss, odynophagia and sore throat.    Eyes: Positive for vision loss in left eye.   Cardiovascular: Positive for claudication, dyspnea on exertion and leg swelling. Negative for chest pain, orthopnea and palpitations.   Respiratory: Positive for cough and shortness of breath. Negative for hemoptysis.    Endocrine: Negative for cold intolerance, heat intolerance, polydipsia, polyphagia and polyuria.   Hematologic/Lymphatic: Does not bruise/bleed easily.   Skin: Negative for itching and rash.   Musculoskeletal: Positive for muscle cramps. Negative for joint pain, joint swelling and myalgias.   Gastrointestinal: Positive for nausea. Negative for abdominal pain, constipation, diarrhea, hematemesis, hematochezia, melena and vomiting.   Genitourinary: Negative for dysuria, frequency and hematuria.   Neurological: Positive for dizziness, light-headedness and tremors. Negative for focal weakness, headaches, numbness and seizures.   Psychiatric/Behavioral: Negative for suicidal ideas. The patient is nervous/anxious.    All other systems reviewed and are negative.    Vitals:    05/15/19 1054   BP: 121/69   BP Location: Right arm   Patient Position: Sitting   Pulse: 80   SpO2: 100%   Weight: 54.3 kg (119 lb 9.6 oz)   Height: 152.4 cm (60\")      Physical Exam   Constitutional: She is oriented to person, place, and time. She " appears well-developed and well-nourished. No distress.    female who is present with her  and appears older than her stated age.   HENT:   Head: Normocephalic and atraumatic.   Eyes: Conjunctivae are normal. No scleral icterus.   Neck: Normal range of motion. No JVD present. Carotid bruit is not present. No tracheal deviation present.   Cardiovascular: Normal rate, regular rhythm and normal heart sounds. Exam reveals no gallop and no friction rub.   No murmur heard.  Pulses:       Femoral pulses are 0 on the right side, and 0 on the left side.       Popliteal pulses are 0 on the right side, and 0 on the left side.        Dorsalis pedis pulses are 0 on the right side, and 0 on the left side.        Posterior tibial pulses are 0 on the right side, and 0 on the left side.   Bilateral dorsalis pedis and posterior tibial Doppler signals.   Pulmonary/Chest: Effort normal and breath sounds normal. No stridor. No respiratory distress. She has no wheezes. She has no rales.   Abdominal: Soft. She exhibits no distension and no mass. There is no tenderness. There is no rebound and no guarding.   Musculoskeletal: Normal range of motion. She exhibits no edema.   Neurological: She is alert and oriented to person, place, and time.   Intact pedal motor function and sensation.   Skin: Skin is warm and dry. No rash noted. She is not diaphoretic. No erythema.   The left foot has a healed ulcer.  There is a 2 cm dry ulceration on the right lateral midfoot near the plantar surface.  There is no surrounding erythema or drainage present.   Psychiatric: She has a normal mood and affect. Her behavior is normal. Judgment and thought content normal.       Labs/Imaging:  -Lower extremity arteriogram performed 12/3/2018, personally reviewed, demonstrates 90% distal right superficial femoral artery stenosis.  She has three-vessel runoff to the right lower extremity.  The left lower extremity demonstrates occlusion of the left  superficial femoral artery at the abductor canal.  There is reconstitution in the popliteal artery above the knee.  She has three-vessel runoff of the left lower extremity.  The left superficial femoral artery was satisfactorily treated with atherectomy and drug coated balloon.  -Aortogram with lower extremity runoff performed 3/20/2019, personally reviewed, demonstrates diffuse disease of the common and external iliac arteries.  There is sheath occlusion of the right common femoral artery.  The right superficial femoral artery has 90% distal stenosis along with popliteal stenosis.  The left external iliac artery has 80% stenosis after the bifurcation.  The left common femoral artery has near occlusion and the left superficial femoral artery ostium is occluded.    Assessment/Plan:  58-year-old  female with a history of hypertension, hyperlipidemia, cervical cancer, TIA, COPD and active tobacco abuse who presents with right lower extremity rest pain and tissue loss.  She has extensive peripheral vascular disease and will need a CTA of the aorta with lower extremity runoff to better evaluate her vasculature for possible revascularization.  I discussed with the patient the importance of smoking cessation in the setting of extensive peripheral vascular disease for 3 to 5 minutes.  I will have the patient return to clinic after these studies have been performed to talk about possible surgical options.  Continue lifelong aspirin, statin and beta-blocker therapy.    Patient Active Problem List   Diagnosis   • Bilateral carotid artery disease (CMS/HCC)   • Essential hypertension   • Tobacco abuse   • PAD (peripheral artery disease) (CMS/HCC)   • COPD mixed type (CMS/HCC)   • Other chest pain   • Shortness of breath   • Hypercholesteremia   • Femoral artery hematoma complicating cardiac catheterization   • H/O carotid endarterectomy

## 2019-05-23 DIAGNOSIS — I10 ESSENTIAL HYPERTENSION: ICD-10-CM

## 2019-05-23 RX ORDER — LISINOPRIL AND HYDROCHLOROTHIAZIDE 12.5; 1 MG/1; MG/1
TABLET ORAL
Qty: 30 TABLET | Refills: 5 | Status: SHIPPED | OUTPATIENT
Start: 2019-05-23 | End: 2019-07-30 | Stop reason: SDUPTHER

## 2019-05-23 RX ORDER — NIFEDIPINE 30 MG/1
TABLET, EXTENDED RELEASE ORAL
Qty: 30 TABLET | Refills: 5 | Status: SHIPPED | OUTPATIENT
Start: 2019-05-23 | End: 2019-07-30 | Stop reason: SINTOL

## 2019-07-26 ENCOUNTER — TELEPHONE (OUTPATIENT)
Dept: CARDIAC SURGERY | Facility: CLINIC | Age: 59
End: 2019-07-26

## 2019-07-26 NOTE — TELEPHONE ENCOUNTER
CALLED PT TO SEE IF SHE WOULD WANT TO R/S HER CX APPT ON 7/3/19 AND HAD N/S HER TEST. PT STATED THAT SHE WOULD NOT WANT TO R/S HER APPT WITH [facility] DUE TO SEEING SOMEONE CLOSER TO HOME

## 2019-07-30 ENCOUNTER — OFFICE VISIT (OUTPATIENT)
Dept: CARDIOLOGY | Facility: CLINIC | Age: 59
End: 2019-07-30

## 2019-07-30 VITALS
DIASTOLIC BLOOD PRESSURE: 66 MMHG | WEIGHT: 102.6 LBS | HEART RATE: 64 BPM | BODY MASS INDEX: 20.14 KG/M2 | SYSTOLIC BLOOD PRESSURE: 90 MMHG | HEIGHT: 60 IN

## 2019-07-30 DIAGNOSIS — J44.9 COPD MIXED TYPE (HCC): ICD-10-CM

## 2019-07-30 DIAGNOSIS — I10 ESSENTIAL HYPERTENSION: ICD-10-CM

## 2019-07-30 DIAGNOSIS — I65.23 BILATERAL CAROTID ARTERY STENOSIS: Primary | ICD-10-CM

## 2019-07-30 DIAGNOSIS — E78.00 HYPERCHOLESTEREMIA: ICD-10-CM

## 2019-07-30 DIAGNOSIS — Z72.0 TOBACCO ABUSE: ICD-10-CM

## 2019-07-30 DIAGNOSIS — Z98.890 H/O CAROTID ENDARTERECTOMY: ICD-10-CM

## 2019-07-30 DIAGNOSIS — L97.511 ISCHEMIC ULCER OF RIGHT FOOT, LIMITED TO BREAKDOWN OF SKIN (HCC): ICD-10-CM

## 2019-07-30 DIAGNOSIS — I73.9 PAD (PERIPHERAL ARTERY DISEASE) (HCC): ICD-10-CM

## 2019-07-30 PROCEDURE — 99214 OFFICE O/P EST MOD 30 MIN: CPT | Performed by: NURSE PRACTITIONER

## 2019-07-30 RX ORDER — ACETAMINOPHEN 500 MG
1000 TABLET ORAL 4 TIMES DAILY PRN
COMMUNITY

## 2019-07-30 RX ORDER — LISINOPRIL AND HYDROCHLOROTHIAZIDE 12.5; 1 MG/1; MG/1
1 TABLET ORAL DAILY
Qty: 30 TABLET | Refills: 5 | Status: SHIPPED | OUTPATIENT
Start: 2019-07-30

## 2019-07-30 RX ORDER — LANOLIN ALCOHOL/MO/W.PET/CERES
CREAM (GRAM) TOPICAL NIGHTLY PRN
COMMUNITY

## 2019-07-30 RX ORDER — HYDROCODONE BITARTRATE AND ACETAMINOPHEN 5; 325 MG/1; MG/1
1 TABLET ORAL 3 TIMES DAILY PRN
COMMUNITY

## 2019-07-30 RX ORDER — ERGOCALCIFEROL 1.25 MG/1
50000 CAPSULE ORAL WEEKLY
COMMUNITY

## 2019-07-30 NOTE — PROGRESS NOTES
"Chief Complaint   Patient presents with   • Follow-up     Cardiac management. She reports in March, Dr Ely attempted peripheral angiogram, he was unable to proceed. Had Diagnostic angiogram of aorto bilateral iliac angiogram per Dr Lanza, see operative report. She is seeing Dr Gastelum for wound on right heel.   • Shortness of Breath     Having with exertion, she reports only walks to bathroom.   • Disorientation     She relates to pain medication.   • Edema     Having swelling in feet since hospital discharge. She states \"keeping feet elevated is helping with the swelling\".   • Colonscopy     Had couple weeks ago per Dr Resendez, she had been having diarrhea, was dx with colitis.   • Med Refill     Needs refills on Zestoretic and Nifedipine-30 day.       Subjective       Felicia Contreras is a 58 y.o. female with HTN, hyperlipidemia, and COPD referred for cardiac clearance prior to carotid endarterectomy in April 2018 after experiencing vision changes, unsteady gait and slurred speech. Lexiscan stress showed no ischemia, echo with normal LV function. JOSE for claudication showed (R) 0.7, (L) 0.8. CTA recommended but insurance denied stating she needed trial of conservative management. She underwent (L) CEA per Dr. Vincent Sánchez and did well. Post op carotid US showed 50-69% with plan to repeat at neuro FU. She developed thrombocytopenia with Plavix and aspirin and discharged with aspirin only followed by Dr. Johnson who felt was medication induced. At her July 2018 visit, smoking cessation, walking and statin recommended. Conservative management did not help and non-healing ulcers developed. She was referred to Dr. Ely. In December 2018, she underwent successful atherectomy of left SFA. Plavix was added. Aspirin continued. She then developed more claudication pain, more bilateral ulcers yo heels. She saw Dr. Lanza, underwent peripheral angiogram with casing stent of the bilateral common iliac artery with 7x37 Visi-Pro.   He " decided not to proceed beyond the iliacs due severe spasm and severe small vessel disease. She has now been referred to another surgeon from [place], Dr. Gross? Dr. LLANOS? She came today for follow up. She denies CP, TIA. She has lost significant weight, 37 lb in one year. She has claudication pain bilaterally and weakness in legs. Ambulating with walker. Pedal edema noted. Labs per Dr. Fried last week. She was noted to have elevated Cr on last admission. She is smoking 2-3 cigs per day, down from 2-3 ppd. She wears nicotine patch.     HPI         Cardiac History:    Past Surgical History:   Procedure Laterality Date   • ARTERIOGRAM N/A 3/20/2019    Procedure: Arteriogram-right ;  Surgeon: Brian Ely MD;  Location: [facility] CATH INVASIVE LOCATION;  Service: Cardiology   • CARDIAC CATHETERIZATION N/A 12/3/2018    Procedure: Atherectomy-peripheral;  Surgeon: Brian Ely MD;  Location: [facility] CATH INVASIVE LOCATION;  Service: Interventional Radiology   • CARDIOVASCULAR STRESS TEST  04/30/2018    L. Cardiolite- Negative   • CAROTID ENDARTERECTOMY Left 2018    at [facility]   • ECHO - CONVERTED  04/30/2018    TLS. EF 65%. RVSP- 18 mmHg   • INTERVENTIONAL RADIOLOGY PROCEDURE Bilateral 12/3/2018    Procedure: ANGIOGRAM EXTREMITY BILATERAL;  Surgeon: Brian Ely MD;  Location:  [facility] CATH INVASIVE LOCATION;  Service: Interventional Radiology   • INTERVENTIONAL RADIOLOGY PROCEDURE N/A 12/3/2018    Procedure: IR PTA each additional peripheral artery;  Surgeon: Brian Ely MD;  Location: [facility] CATH INVASIVE LOCATION;  Service: Interventional Radiology   • OTHER SURGICAL HISTORY  04/12/2018    CTA- 75% (L) ICA. 100% (R) ICA. 100% (R) Verteberal artery.. Small aneurysm of Basilar Artery   • OTHER SURGICAL HISTORY  07/09/2019    ([facility],Dr. Lanza)- peripheral angio. casing stents to bilateral iliac 7x 37 Visi-Pro     Current Outpatient Medications   Medication Sig Dispense Refill   • acetaminophen (TYLENOL) 500 MG tablet Take  1,000 mg by mouth 4 (Four) Times a Day As Needed for Mild Pain .     • albuterol (PROVENTIL HFA;VENTOLIN HFA) 108 (90 Base) MCG/ACT inhaler Inhale 2 puffs Every 4 (Four) Hours As Needed for Wheezing.     • ALPRAZolam (XANAX) 0.5 MG tablet Take 0.5 mg by mouth Daily As Needed.  0   • aspirin 81 MG EC tablet Take 81 mg by mouth Daily.     • atorvastatin (LIPITOR) 40 MG tablet Take 40 mg by mouth Every Night.     • atropine 1 % ophthalmic solution INSTILL 1 DROP INTO THE LEFT EYE TWICE DAILY  1   • clopidogrel (PLAVIX) 75 MG tablet Take 1 tablet by mouth Daily. 30 tablet 11   • HYDROcodone-acetaminophen (NORCO) 5-325 MG per tablet Take 1 tablet by mouth 3 (Three) Times a Day As Needed.     • latanoprost (XALATAN) 0.005 % ophthalmic solution Administer 1 drop to both eyes Every Night.     • lisinopril-hydrochlorothiazide (PRINZIDE,ZESTORETIC) 10-12.5 MG per tablet TAKE 1 TABLET DAILY 30 tablet 5   • melatonin 3 MG tablet Take  by mouth At Night As Needed for Sleep.     • methazolAMIDE (NEPTAZANE) 25 MG tablet Take 50 mg by mouth 2 (Two) Times a Day.  12   • mirtazapine (REMERON) 30 MG tablet TAKE 1 TABLET ONCE DAILY AT BEDTIME  5   • nicotine (NICODERM CQ) 21 MG/24HR patch Place 1 patch on the skin as directed by provider Daily. 30 patch 3   • propranolol (INDERAL) 20 MG tablet Take 20 mg by mouth 3 (Three) Times a Day.     • raNITIdine (ZANTAC) 150 MG tablet Take 150 mg by mouth 2 (Two) Times a Day.  2   • SIMBRINZA 1-0.2 % suspension INSTILL 1 DROP IN EACH EYE 3 TIMES DAILY  5   • timolol (TIMOPTIC) 0.5 % ophthalmic solution Administer 1 drop to both eyes 2 (Two) Times a Day.     • tiotropium bromide monohydrate (SPIRIVA RESPIMAT) 2.5 MCG/ACT aerosol solution inhaler Inhale 2 puffs Daily.     • vitamin D (ERGOCALCIFEROL) 39711 units capsule capsule Take 50,000 Units by mouth 1 (One) Time Per Week.       No current facility-administered medications for this visit.      Patient has no known allergies.    Past Medical  History:   Diagnosis Date   • Anxiety    • Cervical cancer (CMS/HCC)    • Colitis    • COPD (chronic obstructive pulmonary disease) (CMS/HCC)    • Glaucoma    • History of blood transfusion    • History of hysterectomy    • Hyperlipidemia    • Hypertension    • PVD (peripheral vascular disease) (CMS/[facility])    • Status post carotid surgery 2018   • TIA (transient ischemic attack) 2018     Social History     Socioeconomic History   • Marital status:      Spouse name: Not on file   • Number of children: 5   • Years of education: Not on file   • Highest education level: Not on file   Occupational History   • Occupation:      Employer: DISABLED   • 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   • Sexual activity: Defer   Social History Narrative    Lives in [place], [place] with spouse       Family History   Problem Relation Age of Onset   • Hypertension Mother    • Cancer Mother    • Glaucoma Mother    • Colon cancer Father    • No Known Problems Sister    • Stroke Maternal Grandmother    • No Known Problems Sister        Review of Systems   Constitution: Positive for decreased appetite, weakness, malaise/fatigue and weight loss.   Eyes: Positive for blurred vision.   Cardiovascular: Positive for dyspnea on exertion and leg swelling. Negative for chest pain, palpitations and syncope.   Respiratory: Positive for shortness of breath. Negative for cough.    Endocrine: Negative.    Hematologic/Lymphatic: Negative.    Skin: Negative.   "  Musculoskeletal: Negative for falls and myalgias.   Gastrointestinal: Negative for abdominal pain and melena.   Genitourinary: Negative for hematuria.   Neurological: Negative for dizziness.   Psychiatric/Behavioral: Negative for altered mental status and depression.   Allergic/Immunologic: Negative.       Diabetes- No  Thyroid-normal    Objective     BP 90/66 (BP Location: Right arm)   Pulse 64   Ht 152.4 cm (60\")   Wt 46.5 kg (102 lb 9.6 oz)   BMI 20.04 kg/m²     Physical Exam   Constitutional: She is oriented to person, place, and time. She appears well-developed and well-nourished. No distress.   HENT:   Head: Normocephalic.   Eyes: Pupils are equal, round, and reactive to light.   Neck: Normal range of motion.   Cardiovascular: Normal rate and regular rhythm.  No extrasystoles are present.   Murmur heard.   Systolic murmur is present with a grade of 1/6 at the apex.  Unable to palpate pulse secondary to edema   Pulmonary/Chest: Effort normal and breath sounds normal. No respiratory distress. She has no wheezes.   Abdominal: Soft. Bowel sounds are normal. She exhibits no distension.   Musculoskeletal: She exhibits edema (2 + bilaterally, bandage to heel ).   Neurological: She is alert and oriented to person, place, and time.   Skin: Skin is warm and dry. She is not diaphoretic.   Psychiatric: She has a normal mood and affect.   Nursing note and vitals reviewed.     Procedures          Assessment/Plan    She is hypotensive. HR stable. Stop nifedipine. Hopefully this will improve the edema somewhat. Weight loss of 37 lb is concerning. Encouraged to increase protein intake for vascular integrity. We reviewed her reports. Stress and echo were unremarkable. Carotid artery stenosis followed by neuro. If US not repeated by next visit, will order. Regarding the severe PAD, she has seen Dr. Palmer, Dr. Ely, Dr. Lanza, and now plans to see another surgeon from [place]. She will continue Plavix, aspirin, and " statin. We again discussed the importance of smoking cessation. Her labs have been followed with Dr. Fried. She was encouraged to walk as often as tolerated, use assistance with walker to prevent fall. Can elevate legs while sitting to minimize edema. From a cardiac standpoint, she appears stable. I explained to her and her  the risk of amputation with severe PAD, failed procedures, and ongoing smoking. She is trying her best to quit. We will see her back in six months or sooner if needed.   Felicia was seen today for follow-up, shortness of breath, disorientation, edema, colonscopy and med refill.    Diagnoses and all orders for this visit:    Bilateral carotid artery stenosis    PAD (peripheral artery disease) (CMS/HCC)    COPD mixed type (CMS/HCC)    Hypercholesteremia    Essential hypertension    H/O carotid endarterectomy    Ischemic ulcer of right foot, limited to breakdown of skin (CMS/HCC)    Tobacco abuse      Patient's Body mass index is 20.04 kg/m². BMI is within normal parameters. No follow-up required..     Felicia Contreras is a current cigarettes user.  She currently smokes 2 pack of cigarettes and cigarettes per day for a duration of 40 years. I have educated her on the risk of diseases from using tobacco products such as cancer, COPD and heart diease.     I advised her to quit and she is willing to quit. We have discussed the following method/s for tobacco cessation:  OTC Cessation Products.  I spent 3.5 minutes counseling the patient.                     Electronically signed by MAC Chinchilla,  July 30, 2019 5:10 PM

## 2019-09-23 DIAGNOSIS — I10 ESSENTIAL HYPERTENSION: ICD-10-CM

## 2019-09-23 RX ORDER — LISINOPRIL AND HYDROCHLOROTHIAZIDE 12.5; 1 MG/1; MG/1
TABLET ORAL
Qty: 30 TABLET | Refills: 5 | OUTPATIENT
Start: 2019-09-23

## 2019-09-23 RX ORDER — NIFEDIPINE 30 MG/1
TABLET, EXTENDED RELEASE ORAL
Qty: 30 TABLET | Refills: 5 | OUTPATIENT
Start: 2019-09-23

## 2019-10-21 DIAGNOSIS — I10 ESSENTIAL HYPERTENSION: ICD-10-CM

## 2019-10-21 RX ORDER — NIFEDIPINE 30 MG/1
TABLET, EXTENDED RELEASE ORAL
Qty: 30 TABLET | Refills: 5 | OUTPATIENT
Start: 2019-10-21

## 2019-10-22 RX ORDER — CLOPIDOGREL BISULFATE 75 MG/1
75 TABLET ORAL DAILY
Qty: 30 TABLET | Refills: 11 | OUTPATIENT
Start: 2019-10-22

## 2019-12-27 DIAGNOSIS — I10 ESSENTIAL HYPERTENSION: ICD-10-CM

## 2019-12-30 RX ORDER — CLOPIDOGREL BISULFATE 75 MG/1
75 TABLET ORAL DAILY
Qty: 30 TABLET | Refills: 11 | OUTPATIENT
Start: 2019-12-30

## 2019-12-30 NOTE — TELEPHONE ENCOUNTER
Patient of Marcia Crow. Has not been seen in our office in over a year- has appt in [place] in January

## 2020-01-02 RX ORDER — NIFEDIPINE 30 MG/1
TABLET, EXTENDED RELEASE ORAL
Qty: 30 TABLET | Refills: 5 | OUTPATIENT
Start: 2020-01-02

## (undated) DEVICE — CATH ATHRCTMY HAWK1 6F

## (undated) DEVICE — CATH SFT VU RIM BR 5F65CM 0.35

## (undated) DEVICE — SYS COMPR FEMOSTOP GLD W/PUMP LF

## (undated) DEVICE — DESTINATION PERIPHERAL GUIDING SHEATH: Brand: DESTINATION

## (undated) DEVICE — GUIDEWIRE WITH ICE™ HYDROPHILIC COATING: Brand: CHOICE™ PT

## (undated) DEVICE — ST INF PRI SMRTSTE 20DRP 2VLV 24ML 117

## (undated) DEVICE — ADULT DISPOSABLE SINGLE-PATIENT USE PULSE OXIMETER SENSOR: Brand: NONIN

## (undated) DEVICE — SHEATH INTRO SUPERSHEATH JWIRE .035 6F 11CM

## (undated) DEVICE — PERCLOSE PROGLIDE™ SUTURE-MEDIATED CLOSURE SYSTEM: Brand: PERCLOSE PROGLIDE™

## (undated) DEVICE — GUIDEWIRE BOWL W/LID: Brand: MEDLINE INDUSTRIES, INC.

## (undated) DEVICE — BALN ADMIRAL INPACT 5F 4X150MM 130CM

## (undated) DEVICE — Device

## (undated) DEVICE — BALN EVERCROSS OTW .035 5F 5X40 135

## (undated) DEVICE — SHEATH INTRO SUPERSHEATH JWIRE .035 5F 11CM

## (undated) DEVICE — RADIFOCUS GLIDEWIRE: Brand: GLIDEWIRE

## (undated) DEVICE — Device: Brand: MEDEX

## (undated) DEVICE — CANNULA,OXY,ADULT,SUPER SOFT,W/14'TUB,UC: Brand: MEDLINE INDUSTRIES, INC.

## (undated) DEVICE — KT MINI ACC 5F .18X40CM SS 21G 7CM

## (undated) DEVICE — TOTAL TRAY, 16FR 10ML SIL FOLEY, URN: Brand: MEDLINE

## (undated) DEVICE — BALN PTA CHOCOLATE OTW .014 5F 3X40MM

## (undated) DEVICE — ST EXT IV SMARTSITE 2VLV SP M LL 5ML IV1

## (undated) DEVICE — SHEATH INTRO SUPERSHEATH SHRT .035 4F 11CM

## (undated) DEVICE — DRSNG SURESITE WNDW 4X4.5

## (undated) DEVICE — GW INQWIRE FC PTFE J/3MM .035 180

## (undated) DEVICE — ST ACC MICROPUNCTURE .018 ECHO/TRANSLSS/SS/TP 4F/10CM 21G

## (undated) DEVICE — PK CATH CARD 70